# Patient Record
Sex: FEMALE | Race: WHITE | HISPANIC OR LATINO | Employment: FULL TIME | URBAN - METROPOLITAN AREA
[De-identification: names, ages, dates, MRNs, and addresses within clinical notes are randomized per-mention and may not be internally consistent; named-entity substitution may affect disease eponyms.]

---

## 2017-08-15 ENCOUNTER — TELEPHONE (OUTPATIENT)
Dept: FAMILY MEDICINE | Facility: CLINIC | Age: 32
End: 2017-08-15

## 2017-08-15 NOTE — TELEPHONE ENCOUNTER
----- Message from Silver Lee sent at 8/15/2017  9:02 AM CDT -----  Contact: pt  She's calling in regards to having labs prior to scheduled appt, please advise, [phone] (home), please call pt when orders have been entered...

## 2017-08-15 NOTE — TELEPHONE ENCOUNTER
I have never seen this patient before. I do not know her medical history, current problems, etc.  I will need to see her in the office before I can order labs.  Please notify her.

## 2017-09-08 ENCOUNTER — OFFICE VISIT (OUTPATIENT)
Dept: FAMILY MEDICINE | Facility: CLINIC | Age: 32
End: 2017-09-08
Payer: MEDICAID

## 2017-09-08 VITALS
HEIGHT: 63 IN | BODY MASS INDEX: 37.54 KG/M2 | WEIGHT: 211.88 LBS | TEMPERATURE: 99 F | SYSTOLIC BLOOD PRESSURE: 144 MMHG | DIASTOLIC BLOOD PRESSURE: 90 MMHG | OXYGEN SATURATION: 98 % | HEART RATE: 112 BPM

## 2017-09-08 DIAGNOSIS — M79.642 BILATERAL HAND PAIN: ICD-10-CM

## 2017-09-08 DIAGNOSIS — E28.2 POLYCYSTIC OVARIAN DISEASE: Primary | ICD-10-CM

## 2017-09-08 DIAGNOSIS — I10 ESSENTIAL HYPERTENSION: ICD-10-CM

## 2017-09-08 DIAGNOSIS — Z00.00 PHYSICAL EXAM, ROUTINE: ICD-10-CM

## 2017-09-08 DIAGNOSIS — E28.1 HYPERANDROGENEMIA: ICD-10-CM

## 2017-09-08 DIAGNOSIS — M79.641 BILATERAL HAND PAIN: ICD-10-CM

## 2017-09-08 PROCEDURE — 99214 OFFICE O/P EST MOD 30 MIN: CPT | Mod: PBBFAC,PO

## 2017-09-08 PROCEDURE — 99999 PR PBB SHADOW E&M-EST. PATIENT-LVL IV: CPT | Mod: PBBFAC,,,

## 2017-09-08 PROCEDURE — 99202 OFFICE O/P NEW SF 15 MIN: CPT | Mod: S$PBB,,,

## 2017-09-08 PROCEDURE — 3008F BODY MASS INDEX DOCD: CPT | Mod: ,,,

## 2017-09-08 RX ORDER — ALPRAZOLAM 1 MG/1
1 TABLET ORAL 2 TIMES DAILY PRN
Refills: 0 | COMMUNITY
Start: 2017-06-28

## 2017-09-08 RX ORDER — PROPRANOLOL HYDROCHLORIDE 10 MG/1
10 TABLET ORAL 3 TIMES DAILY
Qty: 270 TABLET | Refills: 3 | Status: SHIPPED | OUTPATIENT
Start: 2017-09-08 | End: 2018-01-23

## 2017-09-08 RX ORDER — METFORMIN HYDROCHLORIDE 1000 MG/1
1000 TABLET, FILM COATED, EXTENDED RELEASE ORAL 2 TIMES DAILY WITH MEALS
Qty: 180 TABLET | Refills: 3 | Status: SHIPPED | OUTPATIENT
Start: 2017-09-08 | End: 2018-01-23

## 2017-09-08 RX ORDER — ZALEPLON 10 MG/1
1 CAPSULE ORAL NIGHTLY
Refills: 0 | COMMUNITY
Start: 2017-07-26 | End: 2018-01-23

## 2017-09-08 RX ORDER — VENLAFAXINE HYDROCHLORIDE 75 MG/1
1 CAPSULE, EXTENDED RELEASE ORAL DAILY
Refills: 0 | COMMUNITY
Start: 2017-07-26 | End: 2018-01-23

## 2017-09-08 RX ORDER — SPIRONOLACTONE 25 MG/1
25 TABLET ORAL DAILY
Qty: 90 TABLET | Refills: 3 | Status: SHIPPED | OUTPATIENT
Start: 2017-09-08 | End: 2018-01-30 | Stop reason: SDUPTHER

## 2017-09-08 RX ORDER — DILTIAZEM HYDROCHLORIDE 120 MG/1
120 CAPSULE, EXTENDED RELEASE ORAL DAILY
COMMUNITY
End: 2018-01-23

## 2017-09-08 RX ORDER — GABAPENTIN 100 MG/1
100 CAPSULE ORAL 2 TIMES DAILY
Qty: 180 CAPSULE | Refills: 3 | Status: SHIPPED | OUTPATIENT
Start: 2017-09-08 | End: 2018-01-23

## 2017-09-08 RX ORDER — VENLAFAXINE HYDROCHLORIDE 150 MG/1
1 CAPSULE, EXTENDED RELEASE ORAL DAILY
Refills: 0 | COMMUNITY
Start: 2017-07-26 | End: 2018-01-23

## 2017-09-08 RX ORDER — DEXTROAMPHETAMINE SACCHARATE, AMPHETAMINE ASPARTATE, DEXTROAMPHETAMINE SULFATE AND AMPHETAMINE SULFATE 7.5; 7.5; 7.5; 7.5 MG/1; MG/1; MG/1; MG/1
1.5 TABLET ORAL DAILY
Refills: 0 | COMMUNITY
Start: 2017-07-26 | End: 2018-10-16

## 2017-09-08 RX ORDER — NORGESTREL AND ETHINYL ESTRADIOL 0.3-0.03MG
1 KIT ORAL DAILY
Refills: 11 | COMMUNITY
Start: 2017-08-02 | End: 2018-05-23 | Stop reason: SDUPTHER

## 2017-09-08 NOTE — PROGRESS NOTES
Subjective:       Patient ID: Rosa Maria Jones is a 32 y.o. female.    Chief Complaint: Annual Exam    HPI   The patient presents today with multiple complaints.  Worse complaint is bilateral hand pain that she describes as a constant waxing and waning burning/tingling sensation over her hands.  She says is been going on for about 2 months.  She voices some associated weakness.  She says the hand pain wakes her from her sleep at night she has been using a splint to help assuage the symptoms she has been taking some ibuprofen but says it is not helping.  The patient denies any repetitive motion type work.    She also voices a history of polycystic ovarian syndrome should which was diagnosed at age 14.  She has been on Glumetza in the past along with some Aldactone due to insulin resistance and hirsutism respectively.  She has been doing hormonal therapy for reproductive reasons but states she is no longer interested in doing that and would like to get back on some medication for her insulin resistance and high testosterone levels.  The patient is noted to be overweight.    She was noted to be overweight with a BMI 37.53 kg/m².  She denies any regular exercise routine she denies any type of diet.    Voices a chronic history of elevated heart rates she says it used to run in the 130s but now runs in the 1 teens.  She denies any syncope or near-syncope.  She denies any chest pain or shortness of breath.    Her blood pressure is noted to be elevated 144/90.  The patient says over the last 6 months it has been mildly constantly elevated.  She denies a low salt diet she denies regular exercise routine.  The patient says prior to that it was normal.  She does take amphetamines for ADD.      Review of Systems   Constitutional: Negative for activity change, appetite change, fatigue and unexpected weight change.   HENT: Negative.    Eyes: Negative.    Respiratory: Negative for cough, chest tightness, shortness of breath  and wheezing.    Cardiovascular: Negative for chest pain, palpitations and leg swelling.        Tachycardia   Gastrointestinal: Negative for constipation, diarrhea, nausea and vomiting.   Endocrine: Negative.    Genitourinary: Negative.    Musculoskeletal: Positive for arthralgias (bilateral hand pain).   Skin: Negative for color change.   Allergic/Immunologic: Negative.    Neurological: Negative for dizziness, weakness and light-headedness.   Hematological: Negative.    Psychiatric/Behavioral: Negative for sleep disturbance.         Objective:      Physical Exam   Constitutional: She is oriented to person, place, and time. She appears well-developed and well-nourished. No distress.   HENT:   Head: Normocephalic and atraumatic. Hair is normal.   Right Ear: Hearing, tympanic membrane, external ear and ear canal normal.   Left Ear: Hearing, tympanic membrane, external ear and ear canal normal.   Nose: No mucosal edema, rhinorrhea, nose lacerations, sinus tenderness, nasal deformity, septal deviation or nasal septal hematoma. No epistaxis.  No foreign bodies. Right sinus exhibits no maxillary sinus tenderness and no frontal sinus tenderness. Left sinus exhibits no maxillary sinus tenderness and no frontal sinus tenderness.   Mouth/Throat: Uvula is midline, oropharynx is clear and moist and mucous membranes are normal.   Eyes: Conjunctivae are normal. Pupils are equal, round, and reactive to light. Right eye exhibits no discharge. Left eye exhibits no discharge.   Neck: Trachea normal, normal range of motion and phonation normal. Neck supple. Normal carotid pulses, no hepatojugular reflux and no JVD present. Carotid bruit is not present. No tracheal deviation present. No thyroid mass and no thyromegaly present.   Cardiovascular: Normal rate, regular rhythm, normal heart sounds and intact distal pulses.  Exam reveals no gallop and no friction rub.    No murmur heard.  Pulmonary/Chest: Effort normal and breath sounds  normal. No respiratory distress. She has no decreased breath sounds. She has no wheezes. She has no rhonchi. She has no rales. She exhibits no tenderness.   Musculoskeletal: Normal range of motion.        Right hand: She exhibits normal range of motion, no tenderness, no bony tenderness, normal two-point discrimination, normal capillary refill, no deformity, no laceration and no swelling. Normal sensation noted. Normal strength noted.        Left hand: She exhibits normal range of motion, no tenderness, no bony tenderness, normal two-point discrimination, normal capillary refill, no deformity, no laceration and no swelling. Normal sensation noted. Normal strength noted.   Ed test at the wrist positive on the left negative on the right.   Lymphadenopathy:        Head (right side): No submental, no submandibular, no tonsillar, no preauricular, no posterior auricular and no occipital adenopathy present.        Head (left side): No submental, no submandibular, no tonsillar, no preauricular, no posterior auricular and no occipital adenopathy present.     She has no cervical adenopathy.        Right cervical: No superficial cervical, no deep cervical and no posterior cervical adenopathy present.       Left cervical: No superficial cervical, no deep cervical and no posterior cervical adenopathy present.   Neurological: She is alert and oriented to person, place, and time. She exhibits normal muscle tone. GCS eye subscore is 4. GCS verbal subscore is 5. GCS motor subscore is 6.   Skin: Skin is warm and dry. No rash noted. She is not diaphoretic. No erythema. No pallor.   Psychiatric: She has a normal mood and affect. Her speech is normal and behavior is normal. Judgment and thought content normal.       Assessment:       1. Polycystic ovarian disease    2. Hyperandrogenemia    3. Essential hypertension    4. Bilateral hand pain    5. Physical exam, routine          Plan:   Polycystic ovarian disease  -     CBC auto  differential; Future; Expected date: 09/08/2017  -     Comprehensive metabolic panel; Future; Expected date: 09/08/2017  -     Hemoglobin A1c; Future; Expected date: 09/08/2017  -     Testosterone; Future; Expected date: 09/08/2017  -     TSH; Future; Expected date: 09/08/2017  -     Lipid panel; Future; Expected date: 09/08/2017  -     spironolactone (ALDACTONE) 25 MG tablet; Take 1 tablet (25 mg total) by mouth once daily.  Dispense: 90 tablet; Refill: 3  -     metformin (GLUMETZA) 1000 MG (MOD) 24 hr tablet; Take 1 tablet (1,000 mg total) by mouth 2 (two) times daily with meals.  Dispense: 180 tablet; Refill: 3    Hyperandrogenemia  -     CBC auto differential; Future; Expected date: 09/08/2017  -     Comprehensive metabolic panel; Future; Expected date: 09/08/2017  -     Hemoglobin A1c; Future; Expected date: 09/08/2017  -     Testosterone; Future; Expected date: 09/08/2017  -     TSH; Future; Expected date: 09/08/2017  -     Lipid panel; Future; Expected date: 09/08/2017  -     propranolol (INDERAL) 10 MG tablet; Take 1 tablet (10 mg total) by mouth 3 (three) times daily.  Dispense: 270 tablet; Refill: 3    Essential hypertension  -     CBC auto differential; Future; Expected date: 09/08/2017  -     Comprehensive metabolic panel; Future; Expected date: 09/08/2017  -     Hemoglobin A1c; Future; Expected date: 09/08/2017  -     Testosterone; Future; Expected date: 09/08/2017  -     TSH; Future; Expected date: 09/08/2017  -     Lipid panel; Future; Expected date: 09/08/2017  -     gabapentin (NEURONTIN) 100 MG capsule; Take 1 capsule (100 mg total) by mouth 2 (two) times daily.  Dispense: 180 capsule; Refill: 3    Bilateral hand pain  -     CBC auto differential; Future; Expected date: 09/08/2017  -     Comprehensive metabolic panel; Future; Expected date: 09/08/2017  -     Hemoglobin A1c; Future; Expected date: 09/08/2017  -     Testosterone; Future; Expected date: 09/08/2017  -     TSH; Future; Expected date:  09/08/2017  -     Lipid panel; Future; Expected date: 09/08/2017  -     Nerve conduction test; Future    Physical exam, routine  -     CBC auto differential; Future; Expected date: 09/08/2017  -     Comprehensive metabolic panel; Future; Expected date: 09/08/2017  -     Hemoglobin A1c; Future; Expected date: 09/08/2017  -     Testosterone; Future; Expected date: 09/08/2017  -     TSH; Future; Expected date: 09/08/2017  -     Lipid panel; Future; Expected date: 09/08/2017            Disclaimer: This note is prepared using voice recognition software.  As such there may be errors in the dictation.  It has not been proofread.

## 2017-10-03 RX ORDER — ALBUTEROL SULFATE 90 UG/1
1-2 AEROSOL, METERED RESPIRATORY (INHALATION) EVERY 6 HOURS PRN
Qty: 18 G | Refills: 11 | Status: SHIPPED | OUTPATIENT
Start: 2017-10-03 | End: 2018-11-02 | Stop reason: SDUPTHER

## 2017-10-03 NOTE — TELEPHONE ENCOUNTER
----- Message from Mel Sadler sent at 10/3/2017  8:11 AM CDT -----  Call pt at [phone]//regarding a refill for Proair inhaler call to [facility] on [address]//thks ht

## 2018-01-22 ENCOUNTER — TELEPHONE (OUTPATIENT)
Dept: FAMILY MEDICINE | Facility: CLINIC | Age: 33
End: 2018-01-22

## 2018-01-22 DIAGNOSIS — J32.9 CHRONIC SINUSITIS, UNSPECIFIED LOCATION: Primary | ICD-10-CM

## 2018-01-22 NOTE — TELEPHONE ENCOUNTER
I have signed the referral but I have never seen this patient, she can come and see me maybe we can help out because I suspect that it may not be anytime soon that she will be able to see ENT.

## 2018-01-22 NOTE — TELEPHONE ENCOUNTER
Spoke with patient and informed her of advice per Dr. Ace. Verbalized understanding. Appointment scheduled.

## 2018-01-22 NOTE — TELEPHONE ENCOUNTER
----- Message from Aury Hinton sent at 1/22/2018  8:50 AM CST -----  Contact: [phone]   Pt would like to be worked in for a same day appt, pt complaints of a sore throat, and ear ache. Please call back at [phone].  x-

## 2018-01-22 NOTE — TELEPHONE ENCOUNTER
Spoke with patient and she is asking for a referral to see ENT. She has had a sinus infection for about 2 months. Is this o.k?

## 2018-01-22 NOTE — TELEPHONE ENCOUNTER
----- Message from Mary Dunn sent at 1/22/2018  8:24 AM CST -----  Contact: Patient  Patient state she needs a referral to an ENT and needs to be seen but there are no openings with any provider, please call her back at [phone]. Thank you

## 2018-01-23 ENCOUNTER — OFFICE VISIT (OUTPATIENT)
Dept: FAMILY MEDICINE | Facility: CLINIC | Age: 33
End: 2018-01-23
Payer: MEDICAID

## 2018-01-23 ENCOUNTER — LAB VISIT (OUTPATIENT)
Dept: LAB | Facility: HOSPITAL | Age: 33
End: 2018-01-23
Attending: FAMILY MEDICINE
Payer: MEDICAID

## 2018-01-23 VITALS
SYSTOLIC BLOOD PRESSURE: 120 MMHG | OXYGEN SATURATION: 98 % | BODY MASS INDEX: 37.13 KG/M2 | HEIGHT: 63 IN | WEIGHT: 209.56 LBS | DIASTOLIC BLOOD PRESSURE: 78 MMHG | HEART RATE: 109 BPM | TEMPERATURE: 99 F

## 2018-01-23 DIAGNOSIS — J02.9 SORE THROAT: ICD-10-CM

## 2018-01-23 DIAGNOSIS — J32.1 CHRONIC FRONTAL SINUSITIS: Primary | ICD-10-CM

## 2018-01-23 DIAGNOSIS — Z00.00 PHYSICAL EXAM, ROUTINE: ICD-10-CM

## 2018-01-23 DIAGNOSIS — E28.2 POLYCYSTIC OVARIAN DISEASE: ICD-10-CM

## 2018-01-23 DIAGNOSIS — M79.642 BILATERAL HAND PAIN: ICD-10-CM

## 2018-01-23 DIAGNOSIS — F41.9 ANXIETY: ICD-10-CM

## 2018-01-23 DIAGNOSIS — E28.1 HYPERANDROGENEMIA: ICD-10-CM

## 2018-01-23 DIAGNOSIS — I10 ESSENTIAL HYPERTENSION: ICD-10-CM

## 2018-01-23 DIAGNOSIS — M79.641 BILATERAL HAND PAIN: ICD-10-CM

## 2018-01-23 LAB
ALBUMIN SERPL BCP-MCNC: 4 G/DL
ALP SERPL-CCNC: 61 U/L
ALT SERPL W/O P-5'-P-CCNC: 28 U/L
ANION GAP SERPL CALC-SCNC: 11 MMOL/L
AST SERPL-CCNC: 23 U/L
BASOPHILS # BLD AUTO: 0.03 K/UL
BASOPHILS NFR BLD: 0.3 %
BILIRUB SERPL-MCNC: 0.5 MG/DL
BUN SERPL-MCNC: 10 MG/DL
CALCIUM SERPL-MCNC: 9.2 MG/DL
CHLORIDE SERPL-SCNC: 104 MMOL/L
CHOLEST SERPL-MCNC: 231 MG/DL
CHOLEST/HDLC SERPL: 7.5 {RATIO}
CO2 SERPL-SCNC: 23 MMOL/L
CREAT SERPL-MCNC: 0.8 MG/DL
DIFFERENTIAL METHOD: ABNORMAL
EOSINOPHIL # BLD AUTO: 0.3 K/UL
EOSINOPHIL NFR BLD: 3.6 %
ERYTHROCYTE [DISTWIDTH] IN BLOOD BY AUTOMATED COUNT: 12.2 %
EST. GFR  (AFRICAN AMERICAN): >60 ML/MIN/1.73 M^2
EST. GFR  (NON AFRICAN AMERICAN): >60 ML/MIN/1.73 M^2
ESTIMATED AVG GLUCOSE: 166 MG/DL
GLUCOSE SERPL-MCNC: 172 MG/DL
HBA1C MFR BLD HPLC: 7.4 %
HCT VFR BLD AUTO: 38.5 %
HDLC SERPL-MCNC: 31 MG/DL
HDLC SERPL: 13.4 %
HGB BLD-MCNC: 14 G/DL
IMM GRANULOCYTES # BLD AUTO: 0.07 K/UL
IMM GRANULOCYTES NFR BLD AUTO: 0.8 %
LDLC SERPL CALC-MCNC: 138.4 MG/DL
LYMPHOCYTES # BLD AUTO: 2.3 K/UL
LYMPHOCYTES NFR BLD: 26.3 %
MCH RBC QN AUTO: 31 PG
MCHC RBC AUTO-ENTMCNC: 36.4 G/DL
MCV RBC AUTO: 85 FL
MONOCYTES # BLD AUTO: 0.7 K/UL
MONOCYTES NFR BLD: 7.8 %
NEUTROPHILS # BLD AUTO: 5.4 K/UL
NEUTROPHILS NFR BLD: 61.2 %
NONHDLC SERPL-MCNC: 200 MG/DL
NRBC BLD-RTO: 0 /100 WBC
PLATELET # BLD AUTO: 264 K/UL
PMV BLD AUTO: 11.5 FL
POTASSIUM SERPL-SCNC: 3.7 MMOL/L
PROT SERPL-MCNC: 7.2 G/DL
RBC # BLD AUTO: 4.52 M/UL
SODIUM SERPL-SCNC: 138 MMOL/L
TESTOST SERPL-MCNC: 147 NG/DL
TRIGL SERPL-MCNC: 308 MG/DL
TSH SERPL DL<=0.005 MIU/L-ACNC: 0.58 UIU/ML
WBC # BLD AUTO: 8.82 K/UL

## 2018-01-23 PROCEDURE — 99213 OFFICE O/P EST LOW 20 MIN: CPT | Mod: PBBFAC,PO | Performed by: FAMILY MEDICINE

## 2018-01-23 PROCEDURE — 84403 ASSAY OF TOTAL TESTOSTERONE: CPT

## 2018-01-23 PROCEDURE — 85025 COMPLETE CBC W/AUTO DIFF WBC: CPT

## 2018-01-23 PROCEDURE — 84443 ASSAY THYROID STIM HORMONE: CPT

## 2018-01-23 PROCEDURE — 80053 COMPREHEN METABOLIC PANEL: CPT

## 2018-01-23 PROCEDURE — 80061 LIPID PANEL: CPT

## 2018-01-23 PROCEDURE — 99999 PR PBB SHADOW E&M-EST. PATIENT-LVL III: CPT | Mod: PBBFAC,,, | Performed by: FAMILY MEDICINE

## 2018-01-23 PROCEDURE — 36415 COLL VENOUS BLD VENIPUNCTURE: CPT | Mod: PO

## 2018-01-23 PROCEDURE — 83036 HEMOGLOBIN GLYCOSYLATED A1C: CPT

## 2018-01-23 PROCEDURE — 99214 OFFICE O/P EST MOD 30 MIN: CPT | Mod: S$PBB,,, | Performed by: FAMILY MEDICINE

## 2018-01-23 RX ORDER — LEVOFLOXACIN 500 MG/1
500 TABLET, FILM COATED ORAL DAILY
Qty: 7 TABLET | Refills: 0 | Status: SHIPPED | OUTPATIENT
Start: 2018-01-23 | End: 2018-05-23 | Stop reason: ALTCHOICE

## 2018-01-23 RX ORDER — FLUCONAZOLE 150 MG/1
150 TABLET ORAL DAILY
Qty: 3 TABLET | Refills: 0 | Status: SHIPPED | OUTPATIENT
Start: 2018-01-23 | End: 2018-05-23

## 2018-01-23 NOTE — PROGRESS NOTES
Chief Complaint:    Chief Complaint   Patient presents with    [facility]       History of Present Illness:    Patient presents today she says she's had a sinus infection for almost 2 months, sinus pain and postnasal drip yellow-green discharge.  She has finished 2 rounds of Augmentin and taken prednisone but nothing has helped.    Also has sore throat for the last 3 weeks she has done all the over-the-counter medicine that is possible.    Takes anxiety medicines Xanax as needed.    ROS:  Review of Systems   Constitutional: Negative for appetite change, chills and fever.   HENT: Positive for congestion, postnasal drip, sinus pressure and sore throat. Negative for ear discharge, ear pain, facial swelling, mouth sores, rhinorrhea, sneezing, trouble swallowing and voice change.    Eyes: Negative for discharge, redness and itching.   Respiratory: Negative for cough, chest tightness, shortness of breath and wheezing.    Cardiovascular: Negative for chest pain.   Psychiatric/Behavioral: The patient is nervous/anxious.        Past Medical History:   Diagnosis Date    ADHD (attention deficit hyperactivity disorder)     Anxiety     Asthma     Depression     Insulin resistance     PCOS (polycystic ovarian syndrome)     Tachycardia        Social History:  Social History     Social History    Marital status:      Spouse name: N/A    Number of children: N/A    Years of education: N/A     Social History Main Topics    Smoking status: Current Every Day Smoker     Packs/day: 1.00     Types: Cigarettes    Smokeless tobacco: Never Used    Alcohol use No    Drug use: No    Sexual activity: Yes     Partners: Male     Birth control/ protection: None     Other Topics Concern    None     Social History Narrative    None       Family History:   family history includes Cancer in her father; Diabetes in her father; Hypertension in her father.    Health Maintenance   Topic Date Due    Pneumococcal PPSV23  "(Medium Risk) (1) 05/16/2003    Pap Smear with HPV Cotest  05/16/2006    TETANUS VACCINE  03/19/2011    Influenza Vaccine  08/01/2017    Lipid Panel  Completed       Physical Exam:    Vital Signs  Temp: 98.5 °F (36.9 °C)  Temp src: Tympanic  Pulse: 109  SpO2: 98 %  BP: 120/78  BP Location: Left arm  Patient Position: Sitting  Pain Score: 0-No pain  Height and Weight  Height: 5' 3" (160 cm)  Weight: 95.1 kg (209 lb 8.8 oz)  BSA (Calculated - sq m): 2.06 sq meters  BMI (Calculated): 37.2  Weight in (lb) to have BMI = 25: 140.8]    Body mass index is 37.12 kg/m².    Physical Exam   Constitutional: She is oriented to person, place, and time. She appears well-developed.   HENT:   Right Ear: External ear normal.   Left Ear: External ear normal.   Nose: Right sinus exhibits maxillary sinus tenderness and frontal sinus tenderness. Left sinus exhibits maxillary sinus tenderness and frontal sinus tenderness.   Mouth/Throat: Oropharynx is clear and moist.   Eyes: Conjunctivae are normal. Pupils are equal, round, and reactive to light.   Neck: Normal range of motion. Neck supple.   Cardiovascular: Normal rate, regular rhythm and normal heart sounds.    No murmur heard.  Pulmonary/Chest: Effort normal and breath sounds normal. No respiratory distress. She has no wheezes. She has no rales. She exhibits no tenderness.   Abdominal: Soft. She exhibits no distension and no mass. There is no tenderness. There is no guarding.   Musculoskeletal: She exhibits no edema or tenderness.   Lymphadenopathy:     She has no cervical adenopathy.   Neurological: She is alert and oriented to person, place, and time. She has normal reflexes.   Skin: Skin is warm and dry.   Psychiatric: She has a normal mood and affect. Her behavior is normal. Judgment and thought content normal.       No results found for: CHOL, TRIG, HDL, TOTALCHOLEST, NONHDLCHOL    No results found for: HGBA1C    Assessment:      ICD-10-CM ICD-9-CM   1. Chronic frontal " sinusitis J32.1 473.1   2. Sore throat J02.9 462   3. Anxiety F41.9 300.00         Plan:  1.  Treat with Levaquin and Diflucan, if no improvement consider doing sinus CT.  2.  Chronic sore throat, recommend nasopharyngoscopy  evaluation by ENT.  3.  Continue Xanax when necessary.  4.  Smoking cessation strongly advised patient says she is working on it.    Orders Placed This Encounter   Procedures    Ambulatory referral to ENT       Current Outpatient Prescriptions   Medication Sig Dispense Refill    albuterol (PROAIR HFA) 90 mcg/actuation inhaler Inhale 1-2 puffs into the lungs every 6 (six) hours as needed. As needed 18 g 11    alprazolam (XANAX) 1 MG tablet Take 1 tablet by mouth once daily.  0    dextroamphetamine-amphetamine 30 mg Tab Take 1.5 tablets by mouth once daily.  0    LOW-OGESTREL, 28, 0.3-30 mg-mcg per tablet Take 1 tablet by mouth once daily.  11    spironolactone (ALDACTONE) 25 MG tablet Take 1 tablet (25 mg total) by mouth once daily. 90 tablet 3    fluconazole (DIFLUCAN) 150 MG Tab Take 1 tablet (150 mg total) by mouth once daily. 3 tablet 0    levoFLOXacin (LEVAQUIN) 500 MG tablet Take 1 tablet (500 mg total) by mouth once daily. 7 tablet 0     No current facility-administered medications for this visit.        Medications Discontinued During This Encounter   Medication Reason    diltiaZEM HCl (TIAZAC) 120 mg 24 hr capsule Patient no longer taking    gabapentin (NEURONTIN) 100 MG capsule Patient no longer taking    metformin (GLUMETZA) 1000 MG (MOD) 24 hr tablet Patient no longer taking    propranolol (INDERAL) 10 MG tablet Patient no longer taking    venlafaxine (EFFEXOR-XR) 150 MG Cp24 Patient no longer taking    venlafaxine (EFFEXOR-XR) 75 MG 24 hr capsule Patient no longer taking    zaleplon (SONATA) 10 MG capsule Patient no longer taking       No Follow-up on file.      Venkata Ace MD

## 2018-01-26 DIAGNOSIS — E11.9 TYPE 2 DIABETES MELLITUS WITHOUT COMPLICATION: ICD-10-CM

## 2018-01-30 ENCOUNTER — OFFICE VISIT (OUTPATIENT)
Dept: FAMILY MEDICINE | Facility: CLINIC | Age: 33
End: 2018-01-30
Payer: MEDICAID

## 2018-01-30 ENCOUNTER — LAB VISIT (OUTPATIENT)
Dept: LAB | Facility: HOSPITAL | Age: 33
End: 2018-01-30
Attending: FAMILY MEDICINE
Payer: MEDICAID

## 2018-01-30 VITALS
WEIGHT: 209.88 LBS | TEMPERATURE: 98 F | SYSTOLIC BLOOD PRESSURE: 132 MMHG | HEIGHT: 63 IN | OXYGEN SATURATION: 99 % | DIASTOLIC BLOOD PRESSURE: 82 MMHG | RESPIRATION RATE: 12 BRPM | BODY MASS INDEX: 37.19 KG/M2 | HEART RATE: 99 BPM

## 2018-01-30 DIAGNOSIS — E11.9 TYPE 2 DIABETES MELLITUS WITHOUT COMPLICATION, WITHOUT LONG-TERM CURRENT USE OF INSULIN: ICD-10-CM

## 2018-01-30 DIAGNOSIS — E11.9 TYPE 2 DIABETES MELLITUS WITHOUT COMPLICATION, WITHOUT LONG-TERM CURRENT USE OF INSULIN: Primary | ICD-10-CM

## 2018-01-30 DIAGNOSIS — E28.2 POLYCYSTIC OVARIAN DISEASE: ICD-10-CM

## 2018-01-30 DIAGNOSIS — E78.5 HYPERLIPIDEMIA, UNSPECIFIED HYPERLIPIDEMIA TYPE: ICD-10-CM

## 2018-01-30 PROCEDURE — 36415 COLL VENOUS BLD VENIPUNCTURE: CPT | Mod: PO

## 2018-01-30 PROCEDURE — 99215 OFFICE O/P EST HI 40 MIN: CPT | Mod: PBBFAC,PO

## 2018-01-30 PROCEDURE — 3008F BODY MASS INDEX DOCD: CPT | Mod: ,,,

## 2018-01-30 PROCEDURE — 99999 PR PBB SHADOW E&M-EST. PATIENT-LVL V: CPT | Mod: PBBFAC,,,

## 2018-01-30 PROCEDURE — 99214 OFFICE O/P EST MOD 30 MIN: CPT | Mod: S$PBB,,,

## 2018-01-30 PROCEDURE — 82306 VITAMIN D 25 HYDROXY: CPT

## 2018-01-30 RX ORDER — ATORVASTATIN CALCIUM 20 MG/1
20 TABLET, FILM COATED ORAL DAILY
Qty: 90 TABLET | Refills: 3 | Status: SHIPPED | OUTPATIENT
Start: 2018-01-30 | End: 2020-05-06

## 2018-01-30 RX ORDER — GLIMEPIRIDE 2 MG/1
2 TABLET ORAL
Qty: 90 TABLET | Refills: 3 | Status: SHIPPED | OUTPATIENT
Start: 2018-01-30 | End: 2020-05-06

## 2018-01-30 RX ORDER — SPIRONOLACTONE 50 MG/1
50 TABLET, FILM COATED ORAL DAILY
Qty: 90 TABLET | Refills: 3 | Status: SHIPPED | OUTPATIENT
Start: 2018-01-30 | End: 2018-09-24 | Stop reason: SDUPTHER

## 2018-01-30 RX ORDER — METFORMIN HYDROCHLORIDE 500 MG/1
500 TABLET ORAL 2 TIMES DAILY WITH MEALS
Qty: 180 TABLET | Refills: 3 | Status: SHIPPED | OUTPATIENT
Start: 2018-01-30 | End: 2020-05-06

## 2018-01-31 ENCOUNTER — TELEPHONE (OUTPATIENT)
Dept: FAMILY MEDICINE | Facility: CLINIC | Age: 33
End: 2018-01-31

## 2018-01-31 ENCOUNTER — PATIENT MESSAGE (OUTPATIENT)
Dept: FAMILY MEDICINE | Facility: CLINIC | Age: 33
End: 2018-01-31

## 2018-01-31 DIAGNOSIS — E55.9 VITAMIN D INSUFFICIENCY: Primary | ICD-10-CM

## 2018-01-31 LAB — 25(OH)D3+25(OH)D2 SERPL-MCNC: 18 NG/ML

## 2018-01-31 RX ORDER — ERGOCALCIFEROL 1.25 MG/1
50000 CAPSULE ORAL
Qty: 12 CAPSULE | Refills: 1 | Status: SHIPPED | OUTPATIENT
Start: 2018-01-31 | End: 2018-07-11 | Stop reason: SDUPTHER

## 2018-01-31 NOTE — TELEPHONE ENCOUNTER
----- Message from Aspen ULISSES Goncalves sent at 1/31/2018 12:01 PM CST -----  Contact: PT Portal Request  Appointment Request From: Rosa Maria Jones    With Provider: Other - (see comments)    Would Accept With:Request to see a new provider    Preferred Date Range: Any date 1/25/2018 or later    Preferred Times: Any    Reason for visit: Request an Appt    Comments:  I thought I had a consult scheduled for an ENT but I don't see it in my appointment list. I am not able to schedule it through the [facility] either. Please schedule the first available at any location on any date. I will make it work. Thanks so much! Have a great day!

## 2018-02-08 ENCOUNTER — PATIENT MESSAGE (OUTPATIENT)
Dept: OBSTETRICS AND GYNECOLOGY | Facility: CLINIC | Age: 33
End: 2018-02-08

## 2018-03-22 ENCOUNTER — PATIENT OUTREACH (OUTPATIENT)
Dept: ADMINISTRATIVE | Facility: HOSPITAL | Age: 33
End: 2018-03-22

## 2018-03-22 NOTE — PROGRESS NOTES
Pre visit chart audit performed. I spoke with pt that does have a pap smear scheduled with Kenna Carrillo on 4/4/18 @ 11:15am. Pt has also agreed to have her DM Eye Exam and would like to have that exam [facility]. I did call dept for appt and was unable to reach them. I have left a message for them to call me back for pt appt. I did let the pt know I would call her back for the appt and would mail her an appt slip in the mail. Pt did want to see ENT sooner but I was unable to get pt a sooner appt. Pt will see Dr Izaguirre on the 28th and if she gets worse will go to ER to be seen. Pt verbalized understanding of needed appt's.

## 2018-03-23 NOTE — PROGRESS NOTES
I contacted pt and let her know her DM Eye Exam was booked for 6/14/18 @ 9:30am with Dr Ed Pineda @ our [facility] location. Pt verbalized understanding for needed appt.

## 2018-04-17 NOTE — PROGRESS NOTES
Subjective:       Patient ID: Rosa Maria Jones is a 32 y.o. female.    Chief Complaint: No chief complaint on file.    HPI   Patient presents today to review lab work that was performed last week after an initial visit.  The patient has voiced a history of polycystic ovarian syndrome with associated insulin resistance.  An A1c was drawn which did not being 7.4%, which is indicative of diabetes.  The patient is overweight with a BMI of 37.18 kg/m².  The patient denies a regular exercise routine.  She does not have any formal regulated diet.  Patient states it has been 1 year almost since she has seen an optometrist/ophthalmologist.    The patient also had a lipid panel drawn at the same time.  Her cholesterol was 231 and HDL was 31.  The patient denies a low fat diet she denies regular exercise routine.  She denies any chest pain shortness of breath.  According to [place] data the patient's 10 year risk of developing cardiovascular disease is 12.4%.    Patient voices a history of polycystic ovarian syndrome with associated hirsutism.  She is taking 25 mg spironolactone daily to help assuage her hirsutism.  She says is not helping at the current dose and asked about increasing the dose.       Review of Systems   Constitutional: Negative for activity change, appetite change, fatigue and unexpected weight change.   Eyes: Negative.  Negative for discharge, redness and visual disturbance.   Respiratory: Negative for cough, chest tightness, shortness of breath and wheezing.    Cardiovascular: Negative for chest pain, palpitations and leg swelling.   Gastrointestinal: Negative for constipation, diarrhea, nausea and vomiting.   Endocrine: Negative for polydipsia and polyuria.   Genitourinary: Negative for dysuria and vaginal discharge.   Musculoskeletal: Negative.    Skin: Negative for color change and rash.   Allergic/Immunologic: Negative.    Neurological: Negative for dizziness, weakness and light-headedness.    Hematological: Negative for adenopathy. Does not bruise/bleed easily.   Psychiatric/Behavioral: Negative for dysphoric mood and sleep disturbance.         Objective:      Physical Exam   Constitutional: She is oriented to person, place, and time. She appears well-developed and well-nourished.   HENT:   Head: Normocephalic and atraumatic.   Eyes: Conjunctivae are normal. Pupils are equal, round, and reactive to light. No scleral icterus.   Neck: Normal range of motion. Neck supple.   Cardiovascular: Normal rate, regular rhythm, normal heart sounds and intact distal pulses.  Exam reveals no gallop and no friction rub.    No murmur heard.  Pulses:       Dorsalis pedis pulses are 2+ on the right side, and 2+ on the left side.        Posterior tibial pulses are 2+ on the right side, and 2+ on the left side.   Pulmonary/Chest: Effort normal and breath sounds normal. No respiratory distress. She has no wheezes. She has no rales. She exhibits no tenderness.   Musculoskeletal: Normal range of motion.        Right foot: There is normal range of motion and no deformity.        Left foot: There is normal range of motion and no deformity.   Feet:   Right Foot:   Protective Sensation: 10 sites tested. 10 sites sensed.   Skin Integrity: Negative for ulcer, blister, skin breakdown, erythema, warmth, callus or dry skin.   Left Foot:   Protective Sensation: 10 sites tested. 10 sites sensed.   Skin Integrity: Negative for ulcer, blister, skin breakdown, erythema, warmth, callus or dry skin.   Lymphadenopathy:     She has no cervical adenopathy.   Neurological: She is alert and oriented to person, place, and time. She exhibits normal muscle tone. Coordination normal.   Skin: Skin is warm and dry. No rash noted. No erythema. No pallor.   Psychiatric: She has a normal mood and affect. Her behavior is normal. Judgment and thought content normal.   Vitals reviewed.      Assessment:       1. Type 2 diabetes mellitus without complication,  without long-term current use of insulin    2. Hyperlipidemia, unspecified hyperlipidemia type    3. Polycystic ovarian disease          Plan:   Type 2 diabetes mellitus without complication, without long-term current use of insulin  -     metFORMIN (GLUCOPHAGE) 500 MG tablet; Take 1 tablet (500 mg total) by mouth 2 (two) times daily with meals.  Dispense: 180 tablet; Refill: 3  -     glimepiride (AMARYL) 2 MG tablet; Take 1 tablet (2 mg total) by mouth before breakfast.  Dispense: 90 tablet; Refill: 3  -     Vitamin D; Future; Expected date: 01/30/2018  -     Hepatic function panel; Future; Expected date: 02/28/2018  -     Lipid panel; Future; Expected date: 04/30/2018  -     Hemoglobin A1c; Future; Expected date: 04/30/2018  -     Ambulatory referral to Optometry    Hyperlipidemia, unspecified hyperlipidemia type  -     atorvastatin (LIPITOR) 20 MG tablet; Take 1 tablet (20 mg total) by mouth once daily.  Dispense: 90 tablet; Refill: 3  -     Vitamin D; Future; Expected date: 01/30/2018  -     Hepatic function panel; Future; Expected date: 02/28/2018  -     Lipid panel; Future; Expected date: 04/30/2018  -     Hemoglobin A1c; Future; Expected date: 04/30/2018  -     Ambulatory referral to Optometry    Polycystic ovarian disease  -     spironolactone (ALDACTONE) 50 MG tablet; Take 1 tablet (50 mg total) by mouth once daily.  Dispense: 90 tablet; Refill: 3            Disclaimer: This note is prepared using voice recognition software.     Here for PST prior to surgical procedure Here for PST prior to surgical procedure. S/P  shunt valve replacement.

## 2018-04-30 ENCOUNTER — LAB VISIT (OUTPATIENT)
Dept: LAB | Facility: HOSPITAL | Age: 33
End: 2018-04-30
Attending: FAMILY MEDICINE
Payer: MEDICAID

## 2018-04-30 DIAGNOSIS — E11.9 TYPE 2 DIABETES MELLITUS WITHOUT COMPLICATION, WITHOUT LONG-TERM CURRENT USE OF INSULIN: ICD-10-CM

## 2018-04-30 DIAGNOSIS — E78.5 HYPERLIPIDEMIA, UNSPECIFIED HYPERLIPIDEMIA TYPE: ICD-10-CM

## 2018-04-30 LAB
CHOLEST SERPL-MCNC: 248 MG/DL
CHOLEST/HDLC SERPL: 8.3 {RATIO}
ESTIMATED AVG GLUCOSE: 128 MG/DL
HBA1C MFR BLD HPLC: 6.1 %
HDLC SERPL-MCNC: 30 MG/DL
HDLC SERPL: 12.1 %
LDLC SERPL CALC-MCNC: 170.2 MG/DL
NONHDLC SERPL-MCNC: 218 MG/DL
TRIGL SERPL-MCNC: 239 MG/DL

## 2018-04-30 PROCEDURE — 36415 COLL VENOUS BLD VENIPUNCTURE: CPT | Mod: PO

## 2018-04-30 PROCEDURE — 80061 LIPID PANEL: CPT

## 2018-04-30 PROCEDURE — 83036 HEMOGLOBIN GLYCOSYLATED A1C: CPT

## 2018-05-23 ENCOUNTER — PATIENT MESSAGE (OUTPATIENT)
Dept: OBSTETRICS AND GYNECOLOGY | Facility: CLINIC | Age: 33
End: 2018-05-23

## 2018-05-23 ENCOUNTER — PATIENT MESSAGE (OUTPATIENT)
Dept: FAMILY MEDICINE | Facility: CLINIC | Age: 33
End: 2018-05-23

## 2018-05-23 ENCOUNTER — OFFICE VISIT (OUTPATIENT)
Dept: OTOLARYNGOLOGY | Facility: CLINIC | Age: 33
End: 2018-05-23
Payer: MEDICAID

## 2018-05-23 ENCOUNTER — OFFICE VISIT (OUTPATIENT)
Dept: OBSTETRICS AND GYNECOLOGY | Facility: CLINIC | Age: 33
End: 2018-05-23
Payer: MEDICAID

## 2018-05-23 ENCOUNTER — LAB VISIT (OUTPATIENT)
Dept: LAB | Facility: HOSPITAL | Age: 33
End: 2018-05-23
Attending: OTOLARYNGOLOGY
Payer: MEDICAID

## 2018-05-23 ENCOUNTER — TELEPHONE (OUTPATIENT)
Dept: OTOLARYNGOLOGY | Facility: CLINIC | Age: 33
End: 2018-05-23

## 2018-05-23 VITALS
TEMPERATURE: 99 F | SYSTOLIC BLOOD PRESSURE: 131 MMHG | BODY MASS INDEX: 36.01 KG/M2 | HEIGHT: 63 IN | DIASTOLIC BLOOD PRESSURE: 88 MMHG | WEIGHT: 203.25 LBS | HEART RATE: 118 BPM

## 2018-05-23 VITALS
BODY MASS INDEX: 35.7 KG/M2 | WEIGHT: 201.5 LBS | SYSTOLIC BLOOD PRESSURE: 122 MMHG | HEIGHT: 63 IN | DIASTOLIC BLOOD PRESSURE: 88 MMHG

## 2018-05-23 DIAGNOSIS — J35.8 TONSILLOLITH: ICD-10-CM

## 2018-05-23 DIAGNOSIS — Z86.39 HISTORY OF THYROID NODULE: ICD-10-CM

## 2018-05-23 DIAGNOSIS — N60.02 BREAST CYST, LEFT: ICD-10-CM

## 2018-05-23 DIAGNOSIS — J35.1 TONSILLAR HYPERTROPHY: ICD-10-CM

## 2018-05-23 DIAGNOSIS — E28.2 POLYCYSTIC OVARIAN DISEASE: ICD-10-CM

## 2018-05-23 DIAGNOSIS — Z86.39 HISTORY OF THYROID NODULE: Primary | ICD-10-CM

## 2018-05-23 DIAGNOSIS — Z30.41 ENCOUNTER FOR SURVEILLANCE OF CONTRACEPTIVE PILLS: ICD-10-CM

## 2018-05-23 DIAGNOSIS — F17.200 SMOKING: Primary | ICD-10-CM

## 2018-05-23 DIAGNOSIS — Z01.419 ENCOUNTER FOR GYNECOLOGICAL EXAMINATION WITHOUT ABNORMAL FINDING: Primary | ICD-10-CM

## 2018-05-23 LAB — TSH SERPL DL<=0.005 MIU/L-ACNC: 0.93 UIU/ML

## 2018-05-23 PROCEDURE — 99204 OFFICE O/P NEW MOD 45 MIN: CPT | Mod: S$PBB,,, | Performed by: OTOLARYNGOLOGY

## 2018-05-23 PROCEDURE — 87624 HPV HI-RISK TYP POOLED RSLT: CPT

## 2018-05-23 PROCEDURE — 36415 COLL VENOUS BLD VENIPUNCTURE: CPT

## 2018-05-23 PROCEDURE — 88141 CYTOPATH C/V INTERPRET: CPT | Mod: ,,, | Performed by: PATHOLOGY

## 2018-05-23 PROCEDURE — 84443 ASSAY THYROID STIM HORMONE: CPT

## 2018-05-23 PROCEDURE — 99214 OFFICE O/P EST MOD 30 MIN: CPT | Mod: PBBFAC,PO | Performed by: NURSE PRACTITIONER

## 2018-05-23 PROCEDURE — 99999 PR PBB SHADOW E&M-EST. PATIENT-LVL III: CPT | Mod: PBBFAC,,, | Performed by: OTOLARYNGOLOGY

## 2018-05-23 PROCEDURE — 99213 OFFICE O/P EST LOW 20 MIN: CPT | Mod: PBBFAC,27 | Performed by: OTOLARYNGOLOGY

## 2018-05-23 PROCEDURE — 99395 PREV VISIT EST AGE 18-39: CPT | Mod: 25,S$PBB,, | Performed by: NURSE PRACTITIONER

## 2018-05-23 PROCEDURE — 99999 PR PBB SHADOW E&M-EST. PATIENT-LVL IV: CPT | Mod: PBBFAC,,, | Performed by: NURSE PRACTITIONER

## 2018-05-23 PROCEDURE — 88175 CYTOPATH C/V AUTO FLUID REDO: CPT | Performed by: PATHOLOGY

## 2018-05-23 RX ORDER — NORGESTREL AND ETHINYL ESTRADIOL 0.3-0.03MG
1 KIT ORAL DAILY
Qty: 28 TABLET | Refills: 11 | Status: SHIPPED | OUTPATIENT
Start: 2018-05-23 | End: 2019-10-30 | Stop reason: SDUPTHER

## 2018-05-23 RX ORDER — DILTIAZEM HYDROCHLORIDE 120 MG/1
CAPSULE, EXTENDED RELEASE ORAL
Refills: 11 | COMMUNITY
Start: 2018-05-20 | End: 2020-05-06

## 2018-05-23 RX ORDER — GABAPENTIN 100 MG/1
CAPSULE ORAL
Refills: 3 | COMMUNITY
Start: 2018-05-20 | End: 2018-06-14

## 2018-05-23 NOTE — LETTER
May 23, 2018      Venkata Ace MD  [address] [address] [address]           [facility]  [address] [place] [id number]  Phone: [phone]  Fax: [phone]          Patient: Rosa Maria Jones   MR Number: [id number]   YOB: 1985   Date of Visit: 5/23/2018       Dear Dr. Venkata Ace:    Thank you for referring Rosa Maria Jones to me for evaluation. Attached you will find relevant portions of my assessment and plan of care.    If you have questions, please do not hesitate to call me. I look forward to following Rosa Maria Jones along with you.    Sincerely,    Bao Bolden MD    Enclosure  CC:  No Recipients    If you would like to receive this communication electronically, please contact [email or web address] or [phone] to request more information on [facility] access.    For providers and/or their staff who would like to refer a patient to [facility], please contact us through our one-stop-shop provider referral line, [facility], at [phone].    If you feel you have received this communication in error or would no longer like to receive these types of communications, please e-mail [email or web address]

## 2018-05-23 NOTE — PROGRESS NOTES
Referring Provider:    Venkata Ace Md  [address] [place] [address]  Subjective:   Patient: Rosa Maria Jones [id number], :1985   Visit date:2018 2:10 PM    Chief Complaint:  Sore Throat and Cerumen Impaction    HPI:  Rosa Maria is a 33 y.o. female who I was asked to see in consultation for evaluation of chronic throat discomfort:      Patient reports a sore throat earlier this year beginning in January and lasting for 2-3 months. She was seen byher PCP and tx for sinusitis w/ Levaquin and the sore throat since has resolved. She denies pain in her throat today. Though, she does c/o tonsil stones and swelling in her tonsils on and off, denies difficulty breathing. She also reports a hx of a nodule in her neck she believes this was on her thyroid, pt states last US revealed 2-3 mm nodule. She does not have these records with her today. She denies any recent fevers or infections. She removes tonsillar stones at home w/ Q-tips. No other relieving factors.     Severity: moderate  Quality: dull / achy  Voice Quality: clear  Duration: 3 month(s)  Modifying factors:  None  Associated signs and symptoms:  Pain with swallowing  Post nasal drip or significant rhinorrhea:  No  Bad taste in the back of the mouth: No  Heartburn: No  Prior medical therapy:    None      Review of Systems:  Negative unless checked off.  Gen:  []fever   []fatigue  HENT:  []nosebleeds  []dental problem   Eyes:  []photophobia  []visual disturbance  Resp:  []chest tightness []wheezing  Card:  []chest pain  []leg swelling  GI:  []abdominal pain []blood in stool  :  []dysuria  []hematuria  Musc:  []joint swelling  []gait problem  Skin:  []color change  []pallor  Neuro:  []seizures  []numbness  Hem:  []bruise/bleed easily  Psych:  []hallucinations  []behavioral problems  Allergy/Imm: is allergic to dairy aid [lactase] and latex, natural rubber.    Her meds, allergies, medical, surgical, social & family histories were  "reviewed & updated:  -     She has a current medication list which includes the following prescription(s): albuterol, alprazolam, atorvastatin, dextroamphetamine-amphetamine, diltiazem hcl, ergocalciferol, gabapentin, glimepiride, low-ogestrel (28), metformin, and spironolactone.  -     She  has a past medical history of Abnormal Pap smear of cervix (2015); ADHD (attention deficit hyperactivity disorder); Anxiety; Asthma; Depression; Diabetes type 2, controlled; Insulin resistance; PCOS (polycystic ovarian syndrome); and Tachycardia.   -     She  does not have any pertinent problems on file.   -     She  has a past surgical history that includes cold knife cone (2015).  -     She  reports that she has been smoking Cigarettes.  She has been smoking about 1.00 pack per day. She has never used smokeless tobacco. She reports that she does not drink alcohol or use drugs.  -     Her family history includes Cancer in her father; Diabetes in her father; Hypertension in her father.  -     She is allergic to dairy aid [lactase] and latex, natural rubber.    Objective:   Physical Exam:  Vitals:  /88 (BP Location: Right arm, Patient Position: Sitting, BP Method: Large (Automatic))   Pulse (!) 118   Temp 98.7 °F (37.1 °C) (Tympanic)   Ht 5' 3" (1.6 m)   Wt 92.2 kg (203 lb 4.2 oz)   BMI 36.01 kg/m²   General appearance:  Well developed, well nourished    Eyes:  Extraocular motions intact, PERRL    Communication:  no hoarseness, no dysphonia    Ears:  Otoscopy of external auditory canals and tympanic membranes was normal, clinical speech reception thresholds grossly intact, no mass/lesion of auricle.  Nose:  No masses/lesions of external nose, nasal mucosa, septum, and turbinates were within normal limits.  Mouth:  No mass/lesion of lips, teeth, gums, hard/soft palate, tongue, tonsils, or oropharynx. Tonsils 2+, no stones present    Cardiovascular:  No pedal edema; Radial Pulses +2     Neck & Lymphatics:  No cervical " lymphadenopathy, no neck mass/crepitus/ asymmetry, trachea is midline, no thyroid enlargement/tenderness/mass.    Psych: Oriented x3,  Alert with normal mood and affect.     Respiration/Chest:  Symmetric expansion during respiration, normal respiratory effort.    Skin:  Warm and intact. No ulcerations of face, scalp, neck.      Assessment & Plan:     Tonsil Hypertrophy, bilateral  -Will monitor at this time, no surgical intervention.     Tonsillolith  .plan    Hx of thyroid nodule  -TSH  -US   I will call the patient with results and further treatment recommendations.     Over 25 minutes were spent in face to face contact with the patient with greater than 50% spent discussing their diagnosis and coordination of care.     Bao Bolden MD.

## 2018-05-23 NOTE — PATIENT INSTRUCTIONS
Breast Anatomy    Breasts come in all shapes and sizes. But they all share the same features. You can learn about the parts, or anatomy, of your breasts. This will help you know what you're seeing and feeling. Then you can learn what's normal for your breasts.     The areola is a dark [place] of skin that surrounds the nipple.  The nipple is the outlet for milk during breastfeeding.  Fibrous tissue supports your breasts, making them feel firm.  Lobules (mammary glands) produce milk during pregnancy and breastfeeding.  Ducts carry milk from the lobules during breastfeeding.  Fatty tissue fills the spaces around the ducts and lobules.  Axillary lymph nodes filter lymph fluid from your breast and help your body fight infection.  Ribs can be felt beneath the skin.  The clavicle (collarbone) marks the upper boundary of the breast tissue.  The sternum (breastbone) can be felt beneath the skin.  Chest muscles help move your arm.     Date Last Reviewed: 8/13/2015  © 5177-2805 [employer]. [address] [place] [address]. All rights reserved. This information is not intended as a substitute for professional medical care. Always follow your healthcare professional's instructions.        What Are Benign Breast Conditions?  Most breast conditions are benign (noncancerous), causing no serious harm to you. But all women are at some risk for breast cancer. Your risk increases as you grow older. So if you notice any breast changes that arent normal for you, see your healthcare provider. This helps to make sure that you receive proper treatment if there is a problem.  Breast infection  Infections of the breast tissue can cause skin redness, warmth, and pain or tenderness. The most common infection is mastitis. This inflammation of the mammary glands can happen during breastfeeding. Mastitis and other breast infections are often treated with antibiotics.  Nipple discharge  Many women can squeeze a tiny  amount of a clear or milky discharge from 1 or both nipples. This discharge may be normal. Other kinds of discharge may be symptoms of a breast condition. A dark discharge can be caused by an intraductal papilloma (a benign growth in a duct near the nipple). A nipple discharge that is dark or bloody, or that happens without squeezing your nipple, should be checked by your healthcare provider.    Fibrocystic changes  These benign changes may cause a thickening in the breasts. Breasts may be tender or painful. They may feel more dense in some areas than in others. Sometimes solid or fluid-filled lumps (cysts) may also form. Cysts may be smooth, soft or firm, and tender. They may become larger and more tender right before your period.    Benign breast lumps  Benign breast lumps come in all shapes, textures, and sizes. A fibroadenoma (a lump of fibrous tissue) may be smooth, firm, and rubbery. This type of lump is usually painless and movable. Have any lump checked by your healthcare provider.  Date Last Reviewed: 8/13/2015 © 2000-2017 [employer]. [address] [place] [address]. All rights reserved. This information is not intended as a substitute for professional medical care. Always follow your healthcare professional's instructions.        Understanding Breast Cysts    A breast cyst is a fluid-filled sac that forms in the breast tissue. You can have one or more cysts at a time. They can occur in one or both breasts. Breast cysts can be soft or firm and vary in size. In most cases, they are not cancer (benign). Having breast cysts does not increase your risk for breast cancer.  What causes a breast cyst?  Experts dont know the exact cause of breast cysts. They think that cysts form because of hormone changes that happen during a womans menstrual cycle. They are most common in women ages 35 to 50.  Symptoms of a breast cyst  Often, a breast cyst has no symptoms. The cyst may have been found  during a breast exam or imaging test. If symptoms do occur, they can include:  · A round or oval lump in the breast that can be seen or felt  · Pain, discomfort, or soreness around the lump  · A lump that grows larger or that feels more painful and tender just before your period  Treatment for a breast cyst  Most breast cysts do not need treatment. They may even go away on their own. Breast cysts that are very painful or large may need to be treated. Options include:  · Drainage (aspiration) of the cyst. The healthcare provider uses a needle and syringe to remove the fluid from the cyst.  · Surgical removal of the cyst. The provider cuts the cyst out. It may need to be drained first.  Possible complications of a breast cyst  A breast cyst may go away but then form again. This can happen even after treatment.  When to call your healthcare provider  Call your healthcare provider right away if you have any of these:  · Fever of 100.4°F (38°C) or higher, or as directed  · Pain or soreness in the breast that doesnt go away or that gets worse  · An abnormal lump in the breast  · A change in skin color over the breast  · Dimpling or puckering of the skin over the breast  · A nipple that becomes pulled in (retracted)  · Unusual, dark, or bloody discharge from a nipple   Date Last Reviewed: 6/1/2016  © 7210-4929 [employer]. [address] [place] [address]. All rights reserved. This information is not intended as a substitute for professional medical care. Always follow your healthcare professional's instructions.        The Range of Pap Test Results  When your Pap test is sent to the lab, the lab studies your cell samples and reports any abnormal cell changes. Your health care provider can discuss these changes with you. In some cases, an abnormal Pap test is due to an infection. More serious cell changes range from dysplasia to cancer. Talk to your health care provider about your Pap  test.    Normal results  Cervical cells, even normal ones, are always changing. As they mature, normal squamous cells move from deeper layers within the cervix. Over time, these cells flatten and cover the surface of the cervix. Within the cervical canal, the cells are different. These glandular cells are taller and not as flat as the cells on the surface of the cervix. When a Pap test sample shows healthy cells of both types, the results are negative. Keep having Pap tests as often as directed.  Abnormal results  A positive Pap test result means some cells in the sample showed abnormal changes. These results are grouped by the type of cell change and the location, or extent, of the changes. Depending on the results, you may need further testing.  · Inflammation: Noncancerous changes are present. They may be due to normal cell repair. Or, they may be caused by an infection, such as HPV or yeast. Further testing may be needed. (Also called reactive cellular changes.)  · Atypical squamous cells: Test results are unclear. Cells on the surface of the cervix show changes, but their significance is not yet known. Testing for HPV and other sexually transmitted infections (STIs) may be needed. Treatment may be required. (Reported as ASC-US or ASC-H.)  · Atypical glandular cells: Cells lining the cervical canal show abnormal changes. Further testing is likely. You may also have treatment to destroy or remove problem cells. (Reported as AGC.)  · Mild dysplasia: Cells show distinct changes. More testing or HPV typing may be done. You may also have treatment to destroy or remove problem cells. (Reported as low-grade DEVONTE or JN 1.)  · Moderate to severe dysplasia: Cells show precancerous changes. Or, noninvasive cancer (carcinoma in situ) may be present. Treatment to destroy or remove problem cells is likely. (Reported as high-grade DEVONTE or JN 2 or JN 3.)  · Cancer: Different types of cancer may be detected by your Pap test.  More tests to assess the cancer's extent are likely. The type of treatment will depend on the test results and other factors, such as age and health history. (Reported as squamous cell carcinoma, endocervical adenocarcinoma in situ, or adenocarcinoma.)  Date Last Reviewed: 5/12/2015  © 1599-5519 [employer]. [address] [place] [address]. All rights reserved. This information is not intended as a substitute for professional medical care. Always follow your healthcare professional's instructions.        Polycystic Ovary Syndrome  Polycystic ovary syndrome (PCOS) causes harmless, small cysts in the ovaries and other symptoms. PCOS is caused by certain hormones being out of balance. The word syndrome means a group of symptoms. Women with PCOS may have no periods, irregular periods, or very long periods.    Your ovaries  Women store their eggs in their ovaries. Each egg is in a capsule called a follicle. Normally during the reproductive years, one follicle grows to produce a mature egg each month. This egg is released during ovulation and the follicle dissolves.  Hormones out of balance  With polycystic ovary syndrome (PCOS), the hormones that control ovulation are out of balance. These include estrogen, progesterones, and androgen. As a result, ovulation may not occur. Instead, the follicle stays enlarged. This is a fluid-filled sac (cyst). Over time, the ovaries fill with many small cysts. This is why they are called poly (many) cystic ovaries. In some women, the ovaries also make too much androgen.  PCOS symptoms  Women with PCOS may also have one or more of these symptoms:  · Trouble getting pregnant (fertility problems)  · Weight gain, especially around the waist   · Acne  · Hair growth on the face and other parts of the body  · Patches of thickened, velvety, darkened skin called acanthosis nigricans  Women with PCOS are also at increased risk of developing cancer of the uterine  lining, diabetes, and heart disease.   Date Last Reviewed: 5/10/2015  © 7901-7838 [employer]. [address] [place] [address], [place] [address]. All rights reserved. This information is not intended as a substitute for professional medical care. Always follow your healthcare professional's instructions.

## 2018-05-23 NOTE — PROGRESS NOTES
CC: Well woman exam    Rosa Maria Jones is a 33 y.o. female  presents for well woman exam.  LMP: No LMP recorded (within months). Patient is not currently having periods (Reason: Other)..    History of pcos on ocp, still not with regular cycle. Prior to ocp use - had no cycle for 8 years.   History of abnormal pap and colpo at [facility] with no f/u.    Has pain to left breast and feels a nodule for at least a year.    Pt states has been on spironolactone for many years and was on a 200 mg dose, MD she was seeing no longer practicing in [place], and she has not been increased to this dose.    Past Medical History:   Diagnosis Date    Abnormal Pap smear of cervix     at [facility] JN 3 with what sounds pt had [facility] all paps since normal    ADHD (attention deficit hyperactivity disorder)     Anxiety     Asthma     Depression     Diabetes type 2, controlled     Insulin resistance     PCOS (polycystic ovarian syndrome)     Tachycardia      Past Surgical History:   Procedure Laterality Date    cold knife cone      Pt states laser surgery at [facility] to cervix and vaginal wall - pap f/u was normal      Social History     Social History    Marital status:      Spouse name: N/A    Number of children: N/A    Years of education: N/A     Occupational History    Not on file.     Social History Main Topics    Smoking status: Current Every Day Smoker     Packs/day: 1.00     Types: Cigarettes    Smokeless tobacco: Never Used    Alcohol use No    Drug use: No    Sexual activity: Yes     Partners: Male     Birth control/ protection: OCP     Other Topics Concern    Not on file     Social History Narrative    No narrative on file     Family History   Problem Relation Age of Onset    Diabetes Father     Cancer Father         Salivary gland    Hypertension Father      OB History      Para Term  AB Living    1       1      SAB TAB Ectopic Multiple Live Births    1                  BP  "122/88   Ht 5' 3" (1.6 m)   Wt 91.4 kg (201 lb 8 oz)   LMP  (Within Months)   BMI 35.69 kg/m²       ROS:  GENERAL: Denies weight gain or weight loss. Feeling well overall.   SKIN: Denies rash or lesions.   HEAD: Denies head injury or headache.   NODES: Denies enlarged lymph nodes.   CHEST: Denies chest pain or shortness of breath.   CARDIOVASCULAR: Denies palpitations or left sided chest pain.   ABDOMEN: No abdominal pain, constipation, diarrhea, nausea, vomiting or rectal bleeding.   URINARY: No frequency, dysuria, hematuria, or burning on urination.  REPRODUCTIVE: See HPI.   BREASTS: see hpi  HEMATOLOGIC: No easy bruisability or excessive bleeding.   MUSCULOSKELETAL: Denies joint pain or swelling.   NEUROLOGIC: Denies syncope or weakness.   PSYCHIATRIC: Denies depression, anxiety or mood swings.    PHYSICAL EXAM:  APPEARANCE: Well nourished, well developed, in no acute distress.  AFFECT: WNL, alert and oriented x 3  SKIN: No acne or hirsutism  NECK: Neck symmetric without masses or thyromegaly  NODES: No inguinal, cervical, axillary, or femoral lymph node enlargement  CHEST: Good respiratory effect  ABDOMEN: Soft.  No tenderness or masses.  No hepatosplenomegaly.  No hernias.  BREASTS: Symmetrical, no skin changes or visible lesions.  Small cystic nodule to left mid breast, tender, no nipple discharge bilaterally.  PELVIC: Normal external genitalia without lesions.  Normal hair distribution.  Adequate perineal body, normal urethral meatus.  Vagina moist and well rugated without lesions or discharge.  Cervix pink mild scarring noted at 6 o'clock position, without lesions, discharge or tenderness.  No significant cystocele or rectocele.  Bimanual exam shows uterus to be normal size, regular, mobile and nontender.  Adnexa without masses or tenderness.    EXTREMITIES: No edema.  Physical Exam    1. Encounter for gynecological examination without abnormal finding  Liquid-based pap smear, screening   2. Encounter " for surveillance of contraceptive pills  LOW-OGESTREL, 28, 0.3-30 mg-mcg per tablet   3. Polycystic ovarian disease  LOW-OGESTREL, 28, 0.3-30 mg-mcg per tablet   4. Breast cyst, left  US Breast Left Complete    Mammo Digital Diagnostic Bilat with CAD    AND PLAN:    Patient was counseled today on A.C.S. Pap guidelines and recommendations for yearly pelvic exams, mammograms and monthly self breast exams; to see her PCP for other health maintenance.       Refill ocp  Instructed pt do not feel comfortable with increasing her aldactone and will have to see MD or Endocrine MD for this  Set up dx mmg and u/s pending wants to see general surgry  [facility]

## 2018-05-23 NOTE — TELEPHONE ENCOUNTER
Patient would like information on smoking cessation program with [facility]. Please refer to smoking cessation program if acceptable.       Attempted to contact patient in order to inform her that her request about the smoking cessation program was being processed and that she would receive a phone call with more information in the near future. No answer.

## 2018-05-23 NOTE — TELEPHONE ENCOUNTER
Spoke to patient, informing her that her request about the smoking cessation program was being processed and that she should receive a phone call with more information in the near future. Patient verbalized understanding. Call ended well.

## 2018-05-28 ENCOUNTER — TELEPHONE (OUTPATIENT)
Dept: OTOLARYNGOLOGY | Facility: CLINIC | Age: 33
End: 2018-05-28

## 2018-05-28 NOTE — TELEPHONE ENCOUNTER
Spoke with pt and informed of normal results. Pt verbalized understanding and instructed to call for any further questions

## 2018-05-28 NOTE — TELEPHONE ENCOUNTER
----- Message from Bao Bolden MD sent at 5/25/2018  5:17 PM CDT -----  Please call patient with normal results.

## 2018-05-30 ENCOUNTER — TELEPHONE (OUTPATIENT)
Dept: RADIOLOGY | Facility: HOSPITAL | Age: 33
End: 2018-05-30

## 2018-05-31 ENCOUNTER — HOSPITAL ENCOUNTER (OUTPATIENT)
Dept: RADIOLOGY | Facility: HOSPITAL | Age: 33
Discharge: HOME | End: 2018-05-31
Attending: NURSE PRACTITIONER
Payer: MEDICAID

## 2018-05-31 VITALS — BODY MASS INDEX: 35.97 KG/M2 | WEIGHT: 203 LBS | HEIGHT: 63 IN

## 2018-05-31 DIAGNOSIS — N60.02 BREAST CYST, LEFT: ICD-10-CM

## 2018-05-31 PROCEDURE — 77062 BREAST TOMOSYNTHESIS BI: CPT | Mod: 26,,, | Performed by: RADIOLOGY

## 2018-05-31 PROCEDURE — 77066 DX MAMMO INCL CAD BI: CPT | Mod: TC,PO

## 2018-05-31 PROCEDURE — 76642 ULTRASOUND BREAST LIMITED: CPT | Mod: 26,LT,, | Performed by: RADIOLOGY

## 2018-05-31 PROCEDURE — 77066 DX MAMMO INCL CAD BI: CPT | Mod: 26,,, | Performed by: RADIOLOGY

## 2018-05-31 PROCEDURE — 76642 ULTRASOUND BREAST LIMITED: CPT | Mod: TC,PO,LT

## 2018-06-01 ENCOUNTER — PATIENT MESSAGE (OUTPATIENT)
Dept: OBSTETRICS AND GYNECOLOGY | Facility: CLINIC | Age: 33
End: 2018-06-01

## 2018-06-01 ENCOUNTER — TELEPHONE (OUTPATIENT)
Dept: OBSTETRICS AND GYNECOLOGY | Facility: CLINIC | Age: 33
End: 2018-06-01

## 2018-06-01 LAB
HPV16 AG SPEC QL: NEGATIVE
HPV16+18+H RISK 12 DNA CVX-IMP: POSITIVE
HPV18 DNA SPEC QL NAA+PROBE: NEGATIVE

## 2018-06-01 NOTE — TELEPHONE ENCOUNTER
Attempted to call pt. Left voicemail for pt to give clinic a call back. Regarding ASCUS pap and mammo results.

## 2018-06-01 NOTE — TELEPHONE ENCOUNTER
----- Message from Kenna Mcknight NP sent at 6/1/2018  9:03 AM CDT -----  Mammogram and breast us were normal but due to her history she is over the 20% dmitri for possibly developing breast cancer in her lifetime at 21.44%  Needs appt with Mariela Aldridge

## 2018-06-01 NOTE — TELEPHONE ENCOUNTER
----- Message from Kenna Mcknight NP sent at 6/1/2018 12:40 PM CDT -----  Pap was ASCUS with positive HPV - pt will need a colposcopy set up

## 2018-06-01 NOTE — TELEPHONE ENCOUNTER
Spoke with pt and notified of ASCUS pap and mammo results and recommendations. Pt verbalized understanding. Scheduled appt for colpo per pt request and a message has been sent to [facility]'s staff.

## 2018-06-08 ENCOUNTER — HOSPITAL ENCOUNTER (OUTPATIENT)
Dept: RADIOLOGY | Facility: HOSPITAL | Age: 33
Discharge: HOME | End: 2018-06-08
Attending: OTOLARYNGOLOGY
Payer: MEDICAID

## 2018-06-08 ENCOUNTER — PATIENT MESSAGE (OUTPATIENT)
Dept: OTOLARYNGOLOGY | Facility: CLINIC | Age: 33
End: 2018-06-08

## 2018-06-08 DIAGNOSIS — Z86.39 HISTORY OF THYROID NODULE: ICD-10-CM

## 2018-06-08 PROCEDURE — 76536 US EXAM OF HEAD AND NECK: CPT | Mod: TC

## 2018-06-08 PROCEDURE — 76536 US EXAM OF HEAD AND NECK: CPT | Mod: 26,,, | Performed by: RADIOLOGY

## 2018-06-11 ENCOUNTER — PATIENT MESSAGE (OUTPATIENT)
Dept: OTOLARYNGOLOGY | Facility: CLINIC | Age: 33
End: 2018-06-11

## 2018-06-11 ENCOUNTER — OFFICE VISIT (OUTPATIENT)
Dept: SURGERY | Facility: CLINIC | Age: 33
End: 2018-06-11
Payer: MEDICAID

## 2018-06-11 ENCOUNTER — PATIENT MESSAGE (OUTPATIENT)
Dept: OBSTETRICS AND GYNECOLOGY | Facility: CLINIC | Age: 33
End: 2018-06-11

## 2018-06-11 VITALS
SYSTOLIC BLOOD PRESSURE: 144 MMHG | BODY MASS INDEX: 35.97 KG/M2 | WEIGHT: 203 LBS | HEIGHT: 63 IN | DIASTOLIC BLOOD PRESSURE: 85 MMHG | HEART RATE: 112 BPM

## 2018-06-11 DIAGNOSIS — E04.1 THYROID NODULE: Primary | ICD-10-CM

## 2018-06-11 DIAGNOSIS — Z71.89 COUNSELING REGARDING GOALS OF CARE: ICD-10-CM

## 2018-06-11 DIAGNOSIS — Z91.89 AT HIGH RISK FOR BREAST CANCER: Primary | ICD-10-CM

## 2018-06-11 DIAGNOSIS — Z55.9 EDUCATIONAL CIRCUMSTANCE: ICD-10-CM

## 2018-06-11 PROCEDURE — 99999 PR PBB SHADOW E&M-EST. PATIENT-LVL III: CPT | Mod: PBBFAC,,, | Performed by: NURSE PRACTITIONER

## 2018-06-11 PROCEDURE — 99215 OFFICE O/P EST HI 40 MIN: CPT | Mod: S$PBB,,, | Performed by: NURSE PRACTITIONER

## 2018-06-11 PROCEDURE — 99213 OFFICE O/P EST LOW 20 MIN: CPT | Mod: PBBFAC | Performed by: NURSE PRACTITIONER

## 2018-06-11 RX ORDER — OXCARBAZEPINE 300 MG/1
150 TABLET, FILM COATED ORAL 2 TIMES DAILY
COMMUNITY
End: 2018-10-16

## 2018-06-11 SDOH — SOCIAL DETERMINANTS OF HEALTH (SDOH): PROBLEMS RELATED TO EDUCATION AND LITERACY, UNSPECIFIED: Z55.9

## 2018-06-11 NOTE — PROGRESS NOTES
Patient ID: Rosa Maria Jones is a 33 y.o. female.    Chief Complaint: High risk for breast cancer (21.44%)      HPI:  Patient presents for evaluation of an elevated breast Cancer risk assessment score of 21.44%    Risk factors identified:     Menarche at 13 y/o  G 1 P misc    LMP: 4-5 months ago  Estrogen: BCP- fertility treatment - one cycle  Radiation to the neck or chest wall- none    Prior breast biopsies or atypical hyperplasia- none     FH: none     Wt- 199 lb  Ht- 63 inches  BMI: 35.96    Review of Systems   Constitutional: Negative.    HENT: Negative.    Eyes: Negative.    Respiratory: Negative.    Cardiovascular: Negative.    Gastrointestinal: Negative.         No reflux   Endocrine: Negative.    Genitourinary: Negative.    Musculoskeletal: Negative.    Skin: Negative.    Allergic/Immunologic: Negative.    Neurological: Negative.    Hematological: Negative.  Negative for adenopathy.   Psychiatric/Behavioral: Negative.    Breast: Pt denies relates having left breast pain with palpable thickening for a couple of years- has not noted any change in character. Discomfort mainly with hugging. Denies any nipple discharge. Has noted a thickening left breast at site of discomfort. No prior trauma or bruising. No breast surgeries or abnormalities.    Current Outpatient Prescriptions   Medication Sig Dispense Refill    albuterol (PROAIR HFA) 90 mcg/actuation inhaler Inhale 1-2 puffs into the lungs every 6 (six) hours as needed. As needed 18 g 11    alprazolam (XANAX) 1 MG tablet Take 1 tablet by mouth once daily.  0    atorvastatin (LIPITOR) 20 MG tablet Take 1 tablet (20 mg total) by mouth once daily. 90 tablet 3    dextroamphetamine-amphetamine 30 mg Tab Take 1.5 tablets by mouth once daily.  0    diltiaZEM HCl (TIAZAC) 120 mg 24 hr capsule TK 1 C PO QD  11    ergocalciferol (ERGOCALCIFEROL) 50,000 unit Cap Take 1 capsule (50,000 Units total) by mouth every 7 days. 12 capsule 1    gabapentin (NEURONTIN)  100 MG capsule TK ONE C PO  BID  3    glimepiride (AMARYL) 2 MG tablet Take 1 tablet (2 mg total) by mouth before breakfast. 90 tablet 3    LOW-OGESTREL, 28, 0.3-30 mg-mcg per tablet Take 1 tablet by mouth once daily. 28 tablet 11    metFORMIN (GLUCOPHAGE) 500 MG tablet Take 1 tablet (500 mg total) by mouth 2 (two) times daily with meals. 180 tablet 3    OXcarbazepine (TRILEPTAL) 300 MG Tab Take 150 mg by mouth 2 (two) times daily.      spironolactone (ALDACTONE) 50 MG tablet Take 1 tablet (50 mg total) by mouth once daily. 90 tablet 3     No current facility-administered medications for this visit.        Review of patient's allergies indicates:   Allergen Reactions    Dairy aid [lactase]     Latex, natural rubber        Past Medical History:   Diagnosis Date    Abnormal Pap smear of cervix 2015    at [facility] JN 3 with what sounds pt had [facility] all paps since normal    ADHD (attention deficit hyperactivity disorder)     Anxiety     Asthma     Depression     Diabetes type 2, controlled     Insulin resistance     PCOS (polycystic ovarian syndrome)     Tachycardia        Past Surgical History:   Procedure Laterality Date    cold knife cone  2015    [facility] - pap f/u was normal        Family History   Problem Relation Age of Onset    Diabetes Father     Cancer Father         Salivary gland    Hypertension Father     Breast cancer Maternal Grandmother        Social History     Social History    Marital status:      Spouse name: N/A    Number of children: N/A    Years of education: N/A     Occupational History    Not on file.     Social History Main Topics    Smoking status: Current Every Day Smoker     Packs/day: 1.00     Types: Cigarettes    Smokeless tobacco: Never Used    Alcohol use No    Drug use: No    Sexual activity: Yes     Partners: Male     Birth control/ protection: OCP     Other Topics Concern    Not on file     Social  History Narrative    No narrative on file       Vitals:    06/11/18 1359   BP: (!) 144/85   Pulse: (!) 112       Physical Exam   Constitutional: She is oriented to person, place, and time. She appears well-developed and well-nourished.   HENT:   Head: Normocephalic and atraumatic.   Right Ear: External ear normal.   Left Ear: External ear normal.   Mouth/Throat: No oropharyngeal exudate.   Eyes: Conjunctivae and EOM are normal. Pupils are equal, round, and reactive to light. Right eye exhibits no discharge. Left eye exhibits no discharge. No scleral icterus.   Neck: Normal range of motion. Neck supple. No thyromegaly present.   Cardiovascular: Normal rate, regular rhythm and normal heart sounds.    Pulmonary/Chest: Effort normal and breath sounds normal. Right breast exhibits no inverted nipple, no mass, no nipple discharge, no skin change and no tenderness. Left breast exhibits no inverted nipple, no mass, no nipple discharge, no skin change and no tenderness.       Abdominal: Soft. Bowel sounds are normal.   Musculoskeletal: Normal range of motion. She exhibits no edema.        Right shoulder: She exhibits no crepitus and normal strength.   Lymphadenopathy:        Head (right side): No submental, no submandibular, no tonsillar, no preauricular, no posterior auricular and no occipital adenopathy present.        Head (left side): No submental, no submandibular, no tonsillar, no preauricular, no posterior auricular and no occipital adenopathy present.     She has no cervical adenopathy.        Right cervical: No superficial cervical and no posterior cervical adenopathy present.       Left cervical: No superficial cervical and no posterior cervical adenopathy present.     She has no axillary adenopathy.        Right: No supraclavicular adenopathy present.        Left: No supraclavicular adenopathy present.   Neurological: She is alert and oriented to person, place, and time. She has normal reflexes.   Skin: Skin is  warm and dry. No rash noted. No erythema. No pallor.   Psychiatric: She has a normal mood and affect. Her behavior is normal. Judgment and thought content normal.       [id number]  Result:  Mammo Digital Diagnostic Bilat with Tomosynthesis_CAD  US Breast Left Limited     History:  Patient is 33 y.o. and is seen for a diagnostic mammogram.     Films Compared:  Baseline exam.     Findings:  Computer-aided detection was utilized in the interpretation of this examination. This procedure was performed using tomosynthesis.       The breasts are almost entirely fatty. Limited breast ultrasound was performed in area of reported palpable abnormality on left.  Skin marker was placed at site. . There is no evidence of suspicious masses, microcalcifications or architectural distortion.      Ultrasound evaluation demonstrates no suspicious abnormality.      Impression:  There is no mammographic or sonographic evidence of malignancy.     Results of mammogram and/or ultrasound should not preclude biopsy of any clinically palpable and/or suspicious nodule.     BI-RADS Category 1: Negative     Recommendation:  Annual mammogram is recommended beginning at age 40.     The patient's estimated lifetime risk of breast cancer (to age 85) based on Tyrer-Cuzick - 7 risk assessment model is: Tyrer-Cuzick: 21.44 %. According to the American Cancer Society,  patients with a lifetime breast cancer risk of 20% or higher might benefit from supplemental screening tests.    Encounter     View Encounter                Assessment & Plan:  At high risk for breast cancer  -     MRI Breast Bilateral W WO Contrast; Future; Expected date: 2018    Educational circumstance    Counseling regarding goals of care    Explained results of risk assessment and recommendations for additional screening with MRI in 6 months - Nov/Dec-alternating with Diagnostic mammograms    Discussed modifiable risk factors such as diet and exercise. Reviewed diet and  counseled pt regarding eating more fruits and veges throughout the day. Pt has already lost about 34 # over the past year.   Does  Not formally exercise but does yard work for long periods of time.  Explained benefits of loosing a few pounds to decrease estrogen exposure from fat cells. Pt has set a goal of 10 # wt loss by her next visit.     Would not recommend genetic testing due to  No family history of breast cancer.    Discussed use of tamoxifen for the reduction of breast cancer risk- Smoker and Pt declines at this time due to the potential for hot flashes and blood clots.     BSE technique was demonstrated and explained. Related what to look and feel for on exam monthly. Pt verbalized understanding and return demonstrated proper technique and knowledge of what to look for on self exam.   One hour- 60 minutes was spent with this patient and 75% was spent identifying risk factors, reviewing records and educating pt regarding risk reduction strategies and planning future screenings.    Information about strategies to decrease breast cancer risk factors from Up to Date given to the pt.

## 2018-06-13 NOTE — TELEPHONE ENCOUNTER
Spoke with patient to schedule in office FNA at [facility] location for 6/22/18 with verbal understanding from patient. Directions for location verbally explained.

## 2018-06-14 ENCOUNTER — OFFICE VISIT (OUTPATIENT)
Dept: OPHTHALMOLOGY | Facility: CLINIC | Age: 33
End: 2018-06-14
Payer: MEDICAID

## 2018-06-14 DIAGNOSIS — H40.003 GLAUCOMA SUSPECT, BILATERAL: ICD-10-CM

## 2018-06-14 DIAGNOSIS — H52.03 HYPEROPIA, BILATERAL: ICD-10-CM

## 2018-06-14 DIAGNOSIS — E11.9 DIABETES MELLITUS TYPE 2 WITHOUT RETINOPATHY: Primary | ICD-10-CM

## 2018-06-14 DIAGNOSIS — H52.4 BILATERAL PRESBYOPIA: ICD-10-CM

## 2018-06-14 PROCEDURE — 99212 OFFICE O/P EST SF 10 MIN: CPT | Mod: PBBFAC | Performed by: OPTOMETRIST

## 2018-06-14 PROCEDURE — 92015 DETERMINE REFRACTIVE STATE: CPT | Mod: ,,, | Performed by: OPTOMETRIST

## 2018-06-14 PROCEDURE — 99999 PR PBB SHADOW E&M-EST. PATIENT-LVL II: CPT | Mod: PBBFAC,,, | Performed by: OPTOMETRIST

## 2018-06-14 PROCEDURE — 92004 COMPRE OPH EXAM NEW PT 1/>: CPT | Mod: S$PBB,,, | Performed by: OPTOMETRIST

## 2018-06-14 NOTE — PROGRESS NOTES
HPI     Patient is here to establish care , patient was diagnosed with diabetes   in 01/18 and is here for a baseline , last AIC 6.1 IN 03/18.  Lab Results       Component                Value               Date                       HGBA1C                   6.1 (H)             04/30/2018            NP  +DM 01/18    Last edited by Ed Pineda, OD on 6/14/2018 10:46 AM. (History)            Assessment /Plan     For exam results, see Encounter Report.    Diabetes mellitus type 2 without retinopathy    Hyperopia, bilateral    Bilateral presbyopia    Glaucoma suspect, bilateral      No Background Diabetic Retinopathy    Elevated IOP, no family history of glaucoma, healthy ONH OU    History of accommodative esotropia.    Dispense Final Rx for glasses.  RTC 1 year  Discussed above and answered questions.

## 2018-06-14 NOTE — PATIENT INSTRUCTIONS
Diabetes mellitus type 2 without retinopathy     Hyperopia, bilateral     Bilateral presbyopia     Glaucoma suspect, bilateral        No Background Diabetic Retinopathy     Elevated IOP, no family history of glaucoma, healthy ONH OU     History of accommodative esotropia.     Dispense Final Rx for glasses.  RTC 1 year  Discussed above and answered questions.

## 2018-06-15 ENCOUNTER — PROCEDURE VISIT (OUTPATIENT)
Dept: OBSTETRICS AND GYNECOLOGY | Facility: CLINIC | Age: 33
End: 2018-06-15
Payer: MEDICAID

## 2018-06-15 VITALS
SYSTOLIC BLOOD PRESSURE: 125 MMHG | DIASTOLIC BLOOD PRESSURE: 81 MMHG | WEIGHT: 206.13 LBS | HEIGHT: 63 IN | BODY MASS INDEX: 36.52 KG/M2

## 2018-06-15 DIAGNOSIS — R87.610 ASCUS WITH POSITIVE HIGH RISK HPV CERVICAL: Primary | ICD-10-CM

## 2018-06-15 DIAGNOSIS — R87.810 ASCUS WITH POSITIVE HIGH RISK HPV CERVICAL: Primary | ICD-10-CM

## 2018-06-15 PROCEDURE — 88342 IMHCHEM/IMCYTCHM 1ST ANTB: CPT | Mod: 26,,, | Performed by: PATHOLOGY

## 2018-06-15 PROCEDURE — 81025 URINE PREGNANCY TEST: CPT | Mod: PBBFAC,PO | Performed by: OBSTETRICS & GYNECOLOGY

## 2018-06-15 PROCEDURE — 88305 TISSUE EXAM BY PATHOLOGIST: CPT | Performed by: PATHOLOGY

## 2018-06-15 PROCEDURE — 88342 IMHCHEM/IMCYTCHM 1ST ANTB: CPT | Performed by: PATHOLOGY

## 2018-06-15 PROCEDURE — 88305 TISSUE EXAM BY PATHOLOGIST: CPT | Mod: 26,,, | Performed by: PATHOLOGY

## 2018-06-15 NOTE — PROCEDURES
Colposcopy-Today  Date/Time: 6/15/2018 10:29 AM  Performed by: YASIR MORALES  Authorized by: YASIR MORALES     Consent Done?:  Yes (Written)  Assistants?: No      Colposcopy Site:  Cervix  Position:  Supine  Acrowhite Lesion? Yes    Atypical Vessels: No    Transformation Zone Adequate?: Yes    Biopsy?: Yes         Location:  Cervix ((12 00))  ECC Performed?: No    LEEP Performed?: No    Estimated blood loss (cc):  1   Patient tolerated the procedure well with no immediate complications.   Post-operative instructions were provided for the patient.   Patient was discharged and will follow up if any complications occur     COLPOSCOPY:    Rosa Maria Jones is a 33 y.o. female   presents for colposcopy.  No LMP recorded. Patient is not currently having periods (Reason: Other)..  Her most recent pap smear shows ASCUS with POSITIVE high risk HPV.  Pt with history of JN 2-3 and is s/p LEEP in  with Dr. Mills.    The abnormal test findings were discussed, as well as HPV infection, need for colposcopy and possible biopsies to determine the plan of care, treatments available, the minimal risk of bleeding and infection with colposcopy, and alternatives to colposcopy.  Pt voiced understanding and desires to proceed.      UPT is negative    COLPOSCOPY EXAM:   TIME OUT PERFORMED.     No gross vulvovaginal or cervix lesions noted.  On colpo: no mosaicism, no punctation, no abnormal vasculature and acetowhite lesion(s) noted at 12 o'clock    Biopsy was taken at 12 o'clock (biopsy difficult to obtain due to scarring on cervix and scant tissue noted in specimen).  ECC was not performed.   SCJ was entirely visualized.    Hemostasis was adequate with application of Monsel's solution.  The speculum was removed.  The patient did tolerate the procedure well.    All collected specimens sent to pathology for histologic analysis.    Post-colposcopy counseling:  The patient was instructed to manage post-colposcopy cramping  with NSAIDs or Tylenol, or with a prescription per the medication card.  Avoid intercourse, douching, or tampons in the vagina for at least 2-3 days.  Expect a clumpy blackish discharge due to Monsel's solution application for several days.  Report heavy bleeding, worsening pain or pain that does not respond to above medications, or foul-smelling vaginal discharge. HPV vaccine recommended according to FDA age guidelines.  Importance of follow-up stressed.      Follow up based on colposcopy results.  Impression:  JN 1.  If confirmed by pathology results, recommend follow-up pap in 12 months.

## 2018-06-20 ENCOUNTER — PATIENT MESSAGE (OUTPATIENT)
Dept: OTOLARYNGOLOGY | Facility: CLINIC | Age: 33
End: 2018-06-20

## 2018-06-20 NOTE — TELEPHONE ENCOUNTER
Appointment has bee rescheduled to 6/25/18 at the [facility] location at 11:45 am.

## 2018-06-25 ENCOUNTER — OFFICE VISIT (OUTPATIENT)
Dept: OTOLARYNGOLOGY | Facility: CLINIC | Age: 33
End: 2018-06-25
Attending: OTOLARYNGOLOGY
Payer: MEDICAID

## 2018-06-25 ENCOUNTER — PATIENT MESSAGE (OUTPATIENT)
Dept: OBSTETRICS AND GYNECOLOGY | Facility: CLINIC | Age: 33
End: 2018-06-25

## 2018-06-25 VITALS
WEIGHT: 204.56 LBS | SYSTOLIC BLOOD PRESSURE: 142 MMHG | BODY MASS INDEX: 36.24 KG/M2 | TEMPERATURE: 98 F | HEART RATE: 104 BPM | DIASTOLIC BLOOD PRESSURE: 98 MMHG

## 2018-06-25 DIAGNOSIS — E04.1 THYROID NODULE: ICD-10-CM

## 2018-06-25 PROCEDURE — 99499 UNLISTED E&M SERVICE: CPT | Mod: S$PBB,,, | Performed by: OTOLARYNGOLOGY

## 2018-06-25 PROCEDURE — 99999 PR PBB SHADOW E&M-EST. PATIENT-LVL III: CPT | Mod: PBBFAC,,, | Performed by: OTOLARYNGOLOGY

## 2018-06-25 PROCEDURE — 99213 OFFICE O/P EST LOW 20 MIN: CPT | Mod: PBBFAC,PO | Performed by: OTOLARYNGOLOGY

## 2018-06-25 RX ORDER — OXCARBAZEPINE 300 MG/1
TABLET, FILM COATED ORAL
COMMUNITY
Start: 2018-06-06 | End: 2018-06-25 | Stop reason: SDUPTHER

## 2018-06-25 RX ORDER — DEXTROAMPHETAMINE SACCHARATE, AMPHETAMINE ASPARTATE, DEXTROAMPHETAMINE SULFATE AND AMPHETAMINE SULFATE 3.75; 3.75; 3.75; 3.75 MG/1; MG/1; MG/1; MG/1
15 TABLET ORAL 4 TIMES DAILY
COMMUNITY
Start: 2015-04-01 | End: 2020-09-22

## 2018-06-28 NOTE — PROGRESS NOTES
FNA was attempted but due to depth of nodule and neck circumference, it could not be accessed with biopsy equipment available in clinic.  Given that the nodule is borderline for biopsy, I recommended repeat US in 6 months, which she agreed to.

## 2018-07-11 DIAGNOSIS — E55.9 VITAMIN D INSUFFICIENCY: ICD-10-CM

## 2018-07-11 RX ORDER — ERGOCALCIFEROL 1.25 MG/1
50000 CAPSULE ORAL
Qty: 12 CAPSULE | Refills: 1 | Status: SHIPPED | OUTPATIENT
Start: 2018-07-11 | End: 2019-01-07

## 2018-09-24 DIAGNOSIS — E28.2 POLYCYSTIC OVARIAN DISEASE: ICD-10-CM

## 2018-09-24 RX ORDER — SPIRONOLACTONE 50 MG/1
50 TABLET, FILM COATED ORAL DAILY
Qty: 90 TABLET | Refills: 3 | Status: SHIPPED | OUTPATIENT
Start: 2018-09-24 | End: 2020-05-06

## 2018-09-24 RX ORDER — GABAPENTIN 100 MG/1
CAPSULE ORAL
Refills: 3 | COMMUNITY
Start: 2018-06-18 | End: 2018-09-24 | Stop reason: SDUPTHER

## 2018-09-24 RX ORDER — PROPRANOLOL HYDROCHLORIDE 10 MG/1
TABLET ORAL
Qty: 90 TABLET | Refills: 3 | Status: SHIPPED | OUTPATIENT
Start: 2018-09-24 | End: 2020-05-06

## 2018-09-24 RX ORDER — GABAPENTIN 100 MG/1
CAPSULE ORAL
Qty: 60 CAPSULE | Refills: 3 | Status: SHIPPED | OUTPATIENT
Start: 2018-09-24 | End: 2018-10-16

## 2018-09-24 RX ORDER — PROPRANOLOL HYDROCHLORIDE 10 MG/1
TABLET ORAL
Refills: 3 | COMMUNITY
Start: 2018-06-18 | End: 2018-09-24 | Stop reason: SDUPTHER

## 2018-10-16 ENCOUNTER — OFFICE VISIT (OUTPATIENT)
Dept: OBSTETRICS AND GYNECOLOGY | Facility: CLINIC | Age: 33
End: 2018-10-16
Payer: MEDICAID

## 2018-10-16 VITALS
SYSTOLIC BLOOD PRESSURE: 126 MMHG | WEIGHT: 195.75 LBS | DIASTOLIC BLOOD PRESSURE: 78 MMHG | HEIGHT: 63 IN | BODY MASS INDEX: 34.68 KG/M2

## 2018-10-16 DIAGNOSIS — Z30.8 ENCOUNTER FOR OTHER CONTRACEPTIVE MANAGEMENT: Primary | ICD-10-CM

## 2018-10-16 PROCEDURE — 99213 OFFICE O/P EST LOW 20 MIN: CPT | Mod: PBBFAC,PO | Performed by: OBSTETRICS & GYNECOLOGY

## 2018-10-16 PROCEDURE — 99212 OFFICE O/P EST SF 10 MIN: CPT | Mod: S$PBB,,, | Performed by: OBSTETRICS & GYNECOLOGY

## 2018-10-16 PROCEDURE — 99999 PR PBB SHADOW E&M-EST. PATIENT-LVL III: CPT | Mod: PBBFAC,,, | Performed by: OBSTETRICS & GYNECOLOGY

## 2018-10-16 RX ORDER — ARIPIPRAZOLE 5 MG/1
TABLET ORAL
COMMUNITY
Start: 2018-09-24 | End: 2020-05-06

## 2018-10-16 RX ORDER — LISDEXAMFETAMINE DIMESYLATE 40 MG/1
CAPSULE ORAL
Refills: 0 | COMMUNITY
Start: 2018-08-07 | End: 2020-05-06

## 2018-10-16 RX ORDER — FLUTICASONE PROPIONATE 50 MCG
SPRAY, SUSPENSION (ML) NASAL
COMMUNITY
Start: 2018-09-17 | End: 2019-01-04

## 2018-10-16 RX ORDER — CETIRIZINE HYDROCHLORIDE 10 MG/1
TABLET ORAL
Refills: 0 | COMMUNITY
Start: 2018-08-25 | End: 2019-01-04 | Stop reason: ALTCHOICE

## 2018-10-16 RX ORDER — TOPIRAMATE 100 MG/1
TABLET, FILM COATED ORAL
COMMUNITY
Start: 2018-09-19 | End: 2020-05-06

## 2018-10-16 RX ORDER — TRAZODONE HYDROCHLORIDE 100 MG/1
TABLET ORAL
Refills: 0 | COMMUNITY
Start: 2018-07-29 | End: 2020-05-06

## 2018-10-16 RX ORDER — METHYLPHENIDATE HYDROCHLORIDE 18 MG/1
TABLET ORAL
Refills: 0 | COMMUNITY
Start: 2018-08-04 | End: 2020-05-06

## 2018-10-16 NOTE — PROGRESS NOTES
Subjective:       Patient ID: Rosa Maria Jones is a 33 y.o. female.    Chief Complaint:  Contraception      History of Present Illness  HPI  Contraception Counseling  Patient presents for contraception counseling. The patient has no complaints today. The patient is sexually active with . Pt had been doing well on current OCP but was recently started on Topamax (to suppress increased appetite associated with Abilify use).  Pt was advised that this new medication alters efficacy of contraceptive hormones and is here to discuss options.  Pt has no desire for future fertility.  Pt is here with her  who was present for entire visit.  Menses have been regular on OCP but have become heavier since starting Topamax.      GYN & OB History  Patient's last menstrual period was 10/15/2018.   Date of Last Pap: 2018    OB History    Para Term  AB Living   1       1     SAB TAB Ectopic Multiple Live Births   1              # Outcome Date GA Lbr Dc/2nd Weight Sex Delivery Anes PTL Lv   1 SAB                   Review of Systems  Review of Systems   Constitutional: Negative for activity change, appetite change, fatigue, fever and unexpected weight change.   Respiratory: Negative for shortness of breath.    Cardiovascular: Negative for chest pain.   Gastrointestinal: Negative for abdominal pain, bloating, blood in stool, constipation, diarrhea, nausea and vomiting.   Genitourinary: Negative for menorrhagia, menstrual problem, vaginal bleeding, vaginal discharge, vaginal pain and vaginal odor.   Musculoskeletal: Negative for back pain.   Neurological: Negative for syncope and headaches.           Objective:    Physical Exam:   Constitutional: She is oriented to person, place, and time. She appears well-developed and well-nourished.                           Neurological: She is alert and oriented to person, place, and time.     Psychiatric: She has a normal mood and affect. Her behavior is  normal. Thought content normal.          Assessment:        1. Encounter for other contraceptive management             Plan:      Encounter for other contraceptive management  -     Pt was counseled on contraception and sterilization options, including associated risks and benefits of each.  Options are limited with contraceptive hormones due to current use of Topamax.  Pt has no desires for future fertility and would be a candidate for sterilization if she desires.  Pt voiced understanding and desires to think about options.  Pt will call back with decision.  In the mean time, pt was advised to abstain from intercourse or use back-up forms of contraception.      Follow-up if symptoms worsen or fail to improve.

## 2018-11-02 RX ORDER — ALBUTEROL SULFATE 90 UG/1
AEROSOL, METERED RESPIRATORY (INHALATION)
Qty: 8.5 G | Refills: 0 | Status: SHIPPED | OUTPATIENT
Start: 2018-11-02 | End: 2018-12-09 | Stop reason: SDUPTHER

## 2018-11-09 DIAGNOSIS — E11.9 TYPE 2 DIABETES MELLITUS WITHOUT COMPLICATION: ICD-10-CM

## 2018-11-19 ENCOUNTER — TELEPHONE (OUTPATIENT)
Dept: RADIOLOGY | Facility: HOSPITAL | Age: 33
End: 2018-11-19

## 2018-11-27 ENCOUNTER — TELEPHONE (OUTPATIENT)
Dept: HEMATOLOGY/ONCOLOGY | Facility: CLINIC | Age: 33
End: 2018-11-27

## 2018-12-03 ENCOUNTER — TELEPHONE (OUTPATIENT)
Dept: RADIOLOGY | Facility: HOSPITAL | Age: 33
End: 2018-12-03

## 2018-12-10 RX ORDER — ALBUTEROL SULFATE 90 UG/1
AEROSOL, METERED RESPIRATORY (INHALATION)
Qty: 8.5 G | Refills: 0 | Status: SHIPPED | OUTPATIENT
Start: 2018-12-10 | End: 2019-08-04 | Stop reason: SDUPTHER

## 2018-12-11 ENCOUNTER — TELEPHONE (OUTPATIENT)
Dept: HEMATOLOGY/ONCOLOGY | Facility: CLINIC | Age: 33
End: 2018-12-11

## 2018-12-11 NOTE — TELEPHONE ENCOUNTER
Left MSG for pt to see if she needed to reschedule her MRI that she missed again and the appt with Mariela noland Np that was cancelled from 12/12/18. To reschedule her MRI call [phone] and ask to speak to radiology to get it rescheduled and to reschedule the appt with Mariela she can talk to the appt desk or reschedule it her self through her [facility].

## 2018-12-11 NOTE — TELEPHONE ENCOUNTER
----- Message from Helen Lang LPN sent at 12/11/2018  8:43 AM CST -----  Apparently the MRI table wasn't working and patient will need to reschedule their MRI.

## 2018-12-30 ENCOUNTER — PATIENT MESSAGE (OUTPATIENT)
Dept: OTOLARYNGOLOGY | Facility: CLINIC | Age: 33
End: 2018-12-30

## 2018-12-31 ENCOUNTER — TELEPHONE (OUTPATIENT)
Dept: RADIOLOGY | Facility: HOSPITAL | Age: 33
End: 2018-12-31

## 2019-01-04 ENCOUNTER — OFFICE VISIT (OUTPATIENT)
Dept: OTOLARYNGOLOGY | Facility: CLINIC | Age: 34
End: 2019-01-04
Payer: MEDICAID

## 2019-01-04 VITALS
TEMPERATURE: 98 F | HEART RATE: 110 BPM | BODY MASS INDEX: 36.32 KG/M2 | WEIGHT: 205 LBS | DIASTOLIC BLOOD PRESSURE: 82 MMHG | SYSTOLIC BLOOD PRESSURE: 131 MMHG

## 2019-01-04 DIAGNOSIS — J01.90 ACUTE BACTERIAL RHINOSINUSITIS: Primary | ICD-10-CM

## 2019-01-04 DIAGNOSIS — B96.89 ACUTE BACTERIAL RHINOSINUSITIS: Primary | ICD-10-CM

## 2019-01-04 DIAGNOSIS — J30.89 NON-SEASONAL ALLERGIC RHINITIS, UNSPECIFIED TRIGGER: ICD-10-CM

## 2019-01-04 PROCEDURE — 99999 PR PBB SHADOW E&M-EST. PATIENT-LVL IV: ICD-10-PCS | Mod: PBBFAC,,, | Performed by: PHYSICIAN ASSISTANT

## 2019-01-04 PROCEDURE — 99214 OFFICE O/P EST MOD 30 MIN: CPT | Mod: S$PBB,,, | Performed by: PHYSICIAN ASSISTANT

## 2019-01-04 PROCEDURE — 99214 PR OFFICE/OUTPT VISIT, EST, LEVL IV, 30-39 MIN: ICD-10-PCS | Mod: S$PBB,,, | Performed by: PHYSICIAN ASSISTANT

## 2019-01-04 PROCEDURE — 99214 OFFICE O/P EST MOD 30 MIN: CPT | Mod: PBBFAC,PO | Performed by: PHYSICIAN ASSISTANT

## 2019-01-04 PROCEDURE — 99999 PR PBB SHADOW E&M-EST. PATIENT-LVL IV: CPT | Mod: PBBFAC,,, | Performed by: PHYSICIAN ASSISTANT

## 2019-01-04 RX ORDER — LEVOCETIRIZINE DIHYDROCHLORIDE 5 MG/1
5 TABLET, FILM COATED ORAL NIGHTLY
Qty: 30 TABLET | Refills: 11 | Status: SHIPPED | OUTPATIENT
Start: 2019-01-04 | End: 2020-01-14

## 2019-01-04 RX ORDER — AZELASTINE 1 MG/ML
1 SPRAY, METERED NASAL 2 TIMES DAILY
Qty: 30 ML | Refills: 11 | Status: SHIPPED | OUTPATIENT
Start: 2019-01-04 | End: 2020-01-30

## 2019-01-04 RX ORDER — METHYLPREDNISOLONE 4 MG/1
TABLET ORAL
Qty: 1 PACKAGE | Refills: 0 | Status: SHIPPED | OUTPATIENT
Start: 2019-01-04 | End: 2019-01-25

## 2019-01-04 RX ORDER — AMOXICILLIN AND CLAVULANATE POTASSIUM 875; 125 MG/1; MG/1
1 TABLET, FILM COATED ORAL EVERY 12 HOURS
Qty: 20 TABLET | Refills: 0 | Status: SHIPPED | OUTPATIENT
Start: 2019-01-04 | End: 2019-01-14

## 2019-01-04 RX ORDER — FLUTICASONE PROPIONATE 50 MCG
2 SPRAY, SUSPENSION (ML) NASAL 2 TIMES DAILY
Qty: 9.9 ML | Refills: 11 | Status: SHIPPED | OUTPATIENT
Start: 2019-01-04 | End: 2020-01-30

## 2019-01-04 NOTE — PATIENT INSTRUCTIONS
PLEASE PERFORM SINUS RINSES 3-4 TIMES DAILY UNTIL YOUR NEXT VISIT.          DIRECTIONS FOR SINUS SALINE RINSE To see demonstration: Enter http://www.youtube.com/watch?[email or web address] into the browser address box, or go to You tube, and under the search box, enter sinus rinse. Click on [facility] Sinus Rinse Video    Step 1    Step 1 Please wash your hands. Fill the clean bottle with the designated volume of warm distilled water, filtered water or previously boiled water. You may warm the water in a microwave but we recommend that you warm it in increments of five seconds. This is to avoid excessive heating of the water and damage to the device or scalding your nasal passage.    Step 2    Step 2 Cut the SINUS RINSE mixture packet at the corner and pour its contents into the bottle. Tighten the cap & tube on the bottle securely, place one finger over the tip of the cap and shake the bottle gently to dissolve the mixture.      Step 3    Step 3 Standing in front of a sink, bend forward to your comfort level and tilt your head down. Keeping your mouth open without holding your breath, place cap snugly against your nasal passage and SQUEEZE BOTTLE GENTLY until the solution starts draining from the OPPOSITE nasal passage or from your mouth. Keep squeezing the bottle GENTLY until at least 1/4 to 1/2 (60 to 120 mL) of the bottle is used for a proper rinse. Do not swallow the solution.    Step 4    Blow your nose gently, without pinching your nose completely because this will apply pressure on the    eardrums. If tolerable, sniff in any residual solution remaining in the nasal passage once or twice prior to    blowing your nose as this may clean out the posterior nasopharyngeal area (the area at the back of your    nasal passage). Some solution will reach the back of the throat, so please spit it out. To help improve    drainage of any residual solution, blow your nose gently while tilting your head to the opposite side  of    the nasal passage that you just rinsed.    Step 5    Now repeat steps 3 & 4 for your other nasal passage.    Step 6 Air dry the Sinus Rinse bottle, cap, and tube on a clean paper towel, a glass plate to store the bottle cap and tube. If there is any solution leftover, please discard it. We recommend you make a fresh solution each time you rinse. Rinse 5 times each day, OR as directed by your physician. Warnings: DO NOT RINSE IF NASAL PASSAGE IS COMPLETELY BLOCKED OR IF YOU HAVE AN EAR INFECTION OR BLOCKED EARS. If you have had ear surgery, please contact your physician prior to irrigation. If you experience any pressure in your ears, stop the rinse and get further directions from your physician or contact our office during regular business hours. To avoid any ear discomfort: Heat the solution to lukewarm, do not use hot, boiling or cold water. Keep your mouth open. Do not hold your breath and if possible make the sound DAYAN....DAYAN... Make sure to take the position as shown. Gently squeeze 1/4 of the bottle at a time (60mL / 2 ounces). Stop the rinse if you feel any solution sensation near the ears. You may rinse with a partially blocked nasal passage. Please do not use for any other purposes. Please rinse at least ONE HOUR PRIOR to bedtime, in order to avoid any residual solution dripping in the throat.    >> Before using the SINUS RINSE kit, please inspect the cap, tube and bottle carefully for wear and    tear. If any of the components appear discolored or cracked, please contact [employer] to obtain a    replacement. You must follow the cleaning instructions provided in this brochure or cleaning instruction    card prior to each use.    >> The SINUS RINSE bottle and mixture are to be used only for nasalirrigation. Do not use for any other    purposes.    >> We recommend that you use the rinse ONE HOUR PRIOR to bedtime in order to avoid any residual    solution dripping in the throat.    Tips to avoid ear  discomfort while rinsing    If you have had ear surgery, please contact your physician prior to irrigation. Do not use if you have an    ear infection or blocked ears. Rinse with lukewarm water. Keep your mouth open. Do not hold your    breath while rinsing. While rinsing, make sure to tilt your. Gently squeeze the bottle while rinsing; do    not squeeze the bottle very forcefully. Stop the rinse if you feel a sensation of fluid near your ears.    Tips to avoid unexpected drainage after rinsing    In rare situations, especially if you have had sinus surgery, the saline solution can pool in the sinus    cavities and nasal passages and then drip from your nostrils hours after rinsing. To avoid this harmless    but annoying inconvenience, take one extra step after rinsing: lean forward, tilt your head sideways and    gently blow your nose. Then, tilt your head to the other side and blow again. You may need to repeat    this several times. This will help rid your nasal passages of any excess mucus and remaining saline    solution. If you find yourself experiencing delayed drainage often, do not rinse right before leaving your    house or going to bed.

## 2019-01-07 NOTE — PROGRESS NOTES
Subjective:   Patient: Rosa Maria Jones [id number], :1985   Visit date:2019 5:09 PM    Chief Complaint:  Sore Throat ([facility] urgent care last monday give steroid inj and z-mina ) and Otalgia    HPI:  Rosa Maria is a 33 y.o. female who is here for evaluation of sinus pain and pressure, otalgia, and nasal congestion x 2 weeks:    MAJOR SYMPTOMS:  Yes  Purulent anterior nasal discharge  Yes  Purulent or discolored posterior nasal discharge  Yes  Nasal congestion or obstruction  Yes  Facial Congestion or fullness  No  Hyposmia or anosmia  No  Fever    MINOR SYMPTOMS:  Yes  Headache  Yes  Ear pain, pressure, or fullness  No  Halitosis  No  Dental pain  Yes  Fatigue    The diagnosis of acute sinusitis is based on the presence of at least 2 major or 1 major and at least 2 minor symptoms.  Rosa Maria does meet this criteria.  Clinical Practice Guideline for Acute Bacterial Rhinosinusitis in Children and Adults. Clin Infect Dis 2012; 54:e72    Onset:  2 weeks ago  Exacerbating factors: No  Relieving factors: No  Timing:  worsening          Review of Systems:  -     Allergic/Immunologic: is allergic to adhesive; dairy aid [lactase]; and latex, natural rubber..  -     Constitutional: Current temp: 97.9 °F (36.6 °C) (Tympanic)    Her meds, allergies, medical, surgical, social & family histories were reviewed & updated:  -     She has a current medication list which includes the following prescription(s): alprazolam, aripiprazole, atorvastatin, dextroamphetamine-amphetamine, diltiazem hcl, ergocalciferol, glimepiride, low-ogestrel (28), metformin, methylphenidate hcl, proair hfa, propranolol, spironolactone, topiramate, trazodone, vyvanse, amoxicillin-clavulanate 875-125mg, azelastine, fluticasone, levocetirizine, and methylprednisolone.  -     She  has a past medical history of Abnormal Pap smear of cervix (), ADHD (attention deficit hyperactivity disorder), Anxiety, Asthma, Depression, Diabetes type 2, controlled,  Insulin resistance, PCOS (polycystic ovarian syndrome), and Tachycardia.   -     She does not have any pertinent problems on file.   -     She  has a past surgical history that includes cold knife cone (2015).  -     She  reports that she has been smoking cigarettes.  She has been smoking about 1.00 pack per day. she has never used smokeless tobacco. She reports that she does not drink alcohol or use drugs.  -     Her family history includes Breast cancer in her maternal grandmother; Cancer in her father; Diabetes in her father; Hypertension in her father.  -     She is allergic to adhesive; dairy aid [lactase]; and latex, natural rubber.    Objective:     Physical Exam:  Vitals:  /82   Pulse 110   Temp 97.9 °F (36.6 °C) (Tympanic)   Wt 93 kg (205 lb 0.4 oz)   BMI 36.32 kg/m²   Appearance:  Well-developed, well-nourished.  Communication:  Able to communicate, no hoarseness.  Head & Face:  Normocephalic, atraumatic, no sinus tenderness, normal facial strength.  Eyes:  Extraocular motions intact.  Ears:  Bilateral TM's are with mild bulging, clear, intact. Bilateral EAC's are WNL.   Nose:  Bilateral inf turbs are with moderate edema and erythema. Nasal cavities are with thick/ green mucus bilaterally. No mass/lesions.   Mouth:  No mass/lesion of lips, teeth, gums, hard/soft palate, tongue, tonsils, or oropharynx.Mild erythema of oropharynx.  Neck & Lymphatics:  No cervical lymphadenopathy, no neck mass/crepitus/ asymmetry, trachea is midline, no thyroid enlargement/tenderness/mass.  Neuro/Psych: Alert with normal mood and affect.   Abdominal: Normal appearance.   Respiration/Chest:  Symmetric expansion during respiration, normal respiratory effort.  Skin:  Warm and intact  Cardiovascular:  No peripheral vascular edema or varicosities.    Assessment & Plan:   Rosa Maria was seen today for sore throat and otalgia.    Diagnoses and all orders for this visit:    Acute bacterial rhinosinusitis    Non-seasonal  allergic rhinitis, unspecified trigger    Other orders  -     amoxicillin-clavulanate 875-125mg (AUGMENTIN) 875-125 mg per tablet; Take 1 tablet by mouth every 12 (twelve) hours. for 10 days  -     levocetirizine (XYZAL) 5 MG tablet; Take 1 tablet (5 mg total) by mouth every evening.  -     fluticasone (FLONASE) 50 mcg/actuation nasal spray; 2 sprays (100 mcg total) by Each Nare route 2 (two) times daily.  -     methylPREDNISolone (MEDROL DOSEPACK) 4 mg tablet; use as directed  -     azelastine (ASTELIN) 137 mcg (0.1 %) nasal spray; 1 spray (137 mcg total) by Nasal route 2 (two) times daily.    Augmentin x 10 days   Medrol Dosepak  Start Xyzal QHS  Start nasal saline rinses followed with Flonase and Astelin BID  Patient wishes to contact us if symptoms do not improve/ resolve for a f/u appt.     Over 25 minutes were spent in face to face contact with the patient with greater than 50% spent discussing their diagnosis and coordination of care.     Debbi Stern, PAC

## 2019-03-19 ENCOUNTER — PATIENT MESSAGE (OUTPATIENT)
Dept: SURGERY | Facility: CLINIC | Age: 34
End: 2019-03-19

## 2019-07-17 ENCOUNTER — PATIENT MESSAGE (OUTPATIENT)
Dept: OBSTETRICS AND GYNECOLOGY | Facility: CLINIC | Age: 34
End: 2019-07-17

## 2019-07-29 ENCOUNTER — PATIENT OUTREACH (OUTPATIENT)
Dept: ADMINISTRATIVE | Facility: HOSPITAL | Age: 34
End: 2019-07-29

## 2019-08-04 RX ORDER — ALBUTEROL SULFATE 90 UG/1
AEROSOL, METERED RESPIRATORY (INHALATION)
Qty: 8.5 G | Refills: 0 | Status: SHIPPED | OUTPATIENT
Start: 2019-08-04 | End: 2020-02-20

## 2019-10-28 ENCOUNTER — TELEPHONE (OUTPATIENT)
Dept: OBSTETRICS AND GYNECOLOGY | Facility: CLINIC | Age: 34
End: 2019-10-28

## 2019-10-28 NOTE — TELEPHONE ENCOUNTER
----- Message from Suly Oh sent at 10/28/2019  3:34 PM CDT -----  Contact: [phone]/self   Patient is requesting to speak with you regarding scheduling her annual appt. Please advise.

## 2019-10-28 NOTE — TELEPHONE ENCOUNTER
Spoke to patient and scheduled her appointment for 11/01/19 at 11:30am to see Dr. Isabel at the [place] location. Patient verbalized understanding.

## 2019-10-29 ENCOUNTER — HOSPITAL ENCOUNTER (OUTPATIENT)
Dept: RADIOLOGY | Facility: HOSPITAL | Age: 34
Discharge: HOME | End: 2019-10-29
Attending: OTOLARYNGOLOGY
Payer: MEDICAID

## 2019-10-29 DIAGNOSIS — E04.1 THYROID NODULE: ICD-10-CM

## 2019-10-29 PROCEDURE — 76536 US EXAM OF HEAD AND NECK: CPT | Mod: 26,,, | Performed by: RADIOLOGY

## 2019-10-29 PROCEDURE — 76536 US SOFT TISSUE HEAD NECK THYROID: ICD-10-PCS | Mod: 26,,, | Performed by: RADIOLOGY

## 2019-10-29 PROCEDURE — 76536 US EXAM OF HEAD AND NECK: CPT | Mod: TC

## 2019-10-30 DIAGNOSIS — E28.2 POLYCYSTIC OVARIAN DISEASE: ICD-10-CM

## 2019-10-30 DIAGNOSIS — E11.9 TYPE 2 DIABETES MELLITUS WITHOUT COMPLICATION, WITHOUT LONG-TERM CURRENT USE OF INSULIN: ICD-10-CM

## 2019-10-30 DIAGNOSIS — Z30.41 ENCOUNTER FOR SURVEILLANCE OF CONTRACEPTIVE PILLS: ICD-10-CM

## 2019-10-30 RX ORDER — SPIRONOLACTONE 50 MG/1
50 TABLET, FILM COATED ORAL DAILY
Qty: 90 TABLET | Refills: 3 | OUTPATIENT
Start: 2019-10-30 | End: 2020-10-29

## 2019-10-30 RX ORDER — GLIMEPIRIDE 2 MG/1
2 TABLET ORAL
Qty: 90 TABLET | Refills: 3 | OUTPATIENT
Start: 2019-10-30 | End: 2020-10-29

## 2019-10-30 RX ORDER — METFORMIN HYDROCHLORIDE 500 MG/1
500 TABLET ORAL 2 TIMES DAILY WITH MEALS
Qty: 180 TABLET | Refills: 3 | OUTPATIENT
Start: 2019-10-30 | End: 2020-10-29

## 2019-10-30 RX ORDER — NORGESTREL AND ETHINYL ESTRADIOL 0.3-0.03MG
1 KIT ORAL DAILY
Qty: 28 TABLET | Refills: 0 | Status: SHIPPED | OUTPATIENT
Start: 2019-10-30 | End: 2020-01-31 | Stop reason: ALTCHOICE

## 2019-10-30 RX ORDER — ALBUTEROL SULFATE 90 UG/1
2 AEROSOL, METERED RESPIRATORY (INHALATION) EVERY 6 HOURS PRN
Qty: 8.5 G | Refills: 3 | OUTPATIENT
Start: 2019-10-30

## 2019-10-30 NOTE — TELEPHONE ENCOUNTER
Pt's last annual 10/2018, pt scheduled for annual exam on 11/1. Pt requesting refill on OCP. Medication pended. Pharmacy up to date. Please advise.

## 2019-11-01 ENCOUNTER — PATIENT MESSAGE (OUTPATIENT)
Dept: OTOLARYNGOLOGY | Facility: CLINIC | Age: 34
End: 2019-11-01

## 2019-11-01 DIAGNOSIS — E04.1 THYROID NODULE: Primary | ICD-10-CM

## 2019-11-19 ENCOUNTER — PATIENT MESSAGE (OUTPATIENT)
Dept: OBSTETRICS AND GYNECOLOGY | Facility: CLINIC | Age: 34
End: 2019-11-19

## 2019-12-20 ENCOUNTER — PATIENT MESSAGE (OUTPATIENT)
Dept: OBSTETRICS AND GYNECOLOGY | Facility: CLINIC | Age: 34
End: 2019-12-20

## 2019-12-30 DIAGNOSIS — Z30.41 ENCOUNTER FOR SURVEILLANCE OF CONTRACEPTIVE PILLS: ICD-10-CM

## 2019-12-30 DIAGNOSIS — E28.2 POLYCYSTIC OVARIAN DISEASE: ICD-10-CM

## 2019-12-30 RX ORDER — NORGESTREL AND ETHINYL ESTRADIOL 0.3-0.03MG
1 KIT ORAL DAILY
Qty: 28 TABLET | Refills: 0 | OUTPATIENT
Start: 2019-12-30

## 2020-01-09 ENCOUNTER — PATIENT MESSAGE (OUTPATIENT)
Dept: OBSTETRICS AND GYNECOLOGY | Facility: CLINIC | Age: 35
End: 2020-01-09

## 2020-01-14 RX ORDER — LEVOCETIRIZINE DIHYDROCHLORIDE 5 MG/1
TABLET, FILM COATED ORAL
Qty: 30 TABLET | Refills: 11 | Status: SHIPPED | OUTPATIENT
Start: 2020-01-14 | End: 2020-05-06

## 2020-01-30 RX ORDER — FLUTICASONE PROPIONATE 50 MCG
SPRAY, SUSPENSION (ML) NASAL
Qty: 16 G | Refills: 11 | Status: SHIPPED | OUTPATIENT
Start: 2020-01-30 | End: 2020-02-29

## 2020-01-30 RX ORDER — AZELASTINE 1 MG/ML
SPRAY, METERED NASAL
Qty: 30 ML | Refills: 11 | Status: SHIPPED | OUTPATIENT
Start: 2020-01-30 | End: 2020-05-06

## 2020-01-31 ENCOUNTER — OFFICE VISIT (OUTPATIENT)
Dept: OBSTETRICS AND GYNECOLOGY | Facility: CLINIC | Age: 35
End: 2020-01-31
Payer: MEDICAID

## 2020-01-31 VITALS
HEIGHT: 63 IN | SYSTOLIC BLOOD PRESSURE: 150 MMHG | WEIGHT: 210.13 LBS | BODY MASS INDEX: 37.23 KG/M2 | DIASTOLIC BLOOD PRESSURE: 88 MMHG

## 2020-01-31 DIAGNOSIS — Z30.41 ENCOUNTER FOR SURVEILLANCE OF CONTRACEPTIVE PILLS: Primary | ICD-10-CM

## 2020-01-31 DIAGNOSIS — Z12.4 PAP SMEAR FOR CERVICAL CANCER SCREENING: ICD-10-CM

## 2020-01-31 PROCEDURE — 99999 PR PBB SHADOW E&M-EST. PATIENT-LVL III: ICD-10-PCS | Mod: PBBFAC,,, | Performed by: OBSTETRICS & GYNECOLOGY

## 2020-01-31 PROCEDURE — 99395 PR PREVENTIVE VISIT,EST,18-39: ICD-10-PCS | Mod: S$PBB,,, | Performed by: OBSTETRICS & GYNECOLOGY

## 2020-01-31 PROCEDURE — 99999 PR PBB SHADOW E&M-EST. PATIENT-LVL III: CPT | Mod: PBBFAC,,, | Performed by: OBSTETRICS & GYNECOLOGY

## 2020-01-31 PROCEDURE — 99395 PREV VISIT EST AGE 18-39: CPT | Mod: S$PBB,,, | Performed by: OBSTETRICS & GYNECOLOGY

## 2020-01-31 PROCEDURE — 88175 CYTOPATH C/V AUTO FLUID REDO: CPT

## 2020-01-31 PROCEDURE — 99213 OFFICE O/P EST LOW 20 MIN: CPT | Mod: PBBFAC | Performed by: OBSTETRICS & GYNECOLOGY

## 2020-01-31 RX ORDER — ACETAMINOPHEN AND CODEINE PHOSPHATE 120; 12 MG/5ML; MG/5ML
1 SOLUTION ORAL DAILY
Qty: 28 TABLET | Refills: 11 | Status: SHIPPED | OUTPATIENT
Start: 2020-01-31 | End: 2022-06-07

## 2020-01-31 NOTE — PROGRESS NOTES
Subjective:       Patient ID: Rosa Maria Jones is a 34 y.o. female.    Chief Complaint:  Annual Exam      History of Present Illness  HPI  Annual Exam-Premenopausal  Patient presents for annual exam. The patient has no complaints today. The patient is sexually active. GYN screening history: last pap: approximate date  and was abnormal: ASCUS HPV + with no dysplasia on colpo. The patient wears seatbelts: yes. The patient participates in regular exercise: no. Has the patient ever been transfused or tattooed?: yes. The patient reports that there is not domestic violence in her life.  Menses are sporadic on current OCP.  Desires to continue use.  Pt had a fall that resulted in bruised rib one week ago.  Has been evaluated already.      GYN & OB History  Patient's last menstrual period was 2020.   Date of Last Pap: 2018    OB History    Para Term  AB Living   1       1     SAB TAB Ectopic Multiple Live Births   1              # Outcome Date GA Lbr Dc/2nd Weight Sex Delivery Anes PTL Lv   1 SAB                Review of Systems  Review of Systems   Constitutional: Negative for activity change, appetite change, chills, fatigue, fever and unexpected weight change.   Respiratory: Negative for shortness of breath.    Cardiovascular: Negative for chest pain, palpitations and leg swelling.   Gastrointestinal: Negative for abdominal pain, bloating, blood in stool, constipation, diarrhea, nausea and vomiting.   Genitourinary: Negative for dysmenorrhea, dyspareunia, dysuria, flank pain, frequency, genital sores, hematuria, menorrhagia, menstrual problem, pelvic pain, urgency, vaginal bleeding, vaginal discharge, vaginal pain, urinary incontinence, postcoital bleeding, vaginal dryness and vaginal odor.   Musculoskeletal: Negative for back pain.   Integumentary:  Negative for breast mass, nipple discharge, breast skin changes and breast tenderness.   Neurological: Negative for syncope and  headaches.   Breast: Negative for asymmetry, lump, mass, mastodynia, nipple discharge, skin changes and tenderness          Objective:    Physical Exam:   Constitutional: She is oriented to person, place, and time. She appears well-developed and well-nourished. No distress.    HENT:   Head: Normocephalic and atraumatic.    Eyes: Pupils are equal, round, and reactive to light. EOM are normal.    Neck: Normal range of motion.    Cardiovascular: Normal rate, regular rhythm and normal heart sounds.     Pulmonary/Chest: Effort normal and breath sounds normal.        Abdominal: Soft. Bowel sounds are normal. She exhibits no distension. There is no tenderness.     Genitourinary: Vagina normal and uterus normal. Pelvic exam was performed with patient supine. There is no rash, tenderness, lesion or injury on the right labia. There is no rash, tenderness, lesion or injury on the left labia. Uterus is not deviated, not enlarged and not tender. Cervix is normal. Right adnexum displays no mass, no tenderness and no fullness. Left adnexum displays no mass, no tenderness and no fullness. No erythema, tenderness or bleeding in the vagina. No foreign body in the vagina. No signs of injury around the vagina. No vaginal discharge found. Cervix exhibits no motion tenderness, no discharge and no friability. Additional cervical findings: pap smear done          Musculoskeletal: Normal range of motion and moves all extremeties. She exhibits no edema or tenderness.       Neurological: She is alert and oriented to person, place, and time.    Skin: Skin is warm and dry.    Psychiatric: She has a normal mood and affect. Her behavior is normal. Thought content normal.          Assessment:        1. Encounter for surveillance of contraceptive pills    2. Pap smear for cervical cancer screening             Plan:      Encounter for surveillance of contraceptive pills  -     norethindrone (ORTHO MICRONOR) 0.35 mg tablet; Take 1 tablet (0.35 mg  total) by mouth once daily.  Dispense: 28 tablet; Refill: 11  -     Pt was counseled on contraception options, including associated risks and benefits of each.  Options are limited due to age, smoker, and use of medications.  Pt was advised on more reliable long active contraceptives (IUD, nexplanon) and sterilization.  Pt voiced understanding and desires to proceed with switch to Micronor.  Medication dosing, side-effects, risks, benefits, and alternatives were discussed.  Medical history was reviewed and pt is a candidate for Micronor use.    Pap smear for cervical cancer screening  -     Liquid-Based Pap Smear, Screening  -     Pt was counseled on cervical/vaginal screening guidelines and recommendations.  Last pap ASCUS HPV + with no dysplasia on colpo on 2018.  If today's pap smear result is negative, next pap smear will be due in 2021.  -     Pt was advised on current breast cancer screening recommendations.  -     Follow up with PCP for routine health maintenance needs.        Follow up in about 1 year (around 1/31/2021).

## 2020-02-20 RX ORDER — ALBUTEROL SULFATE 90 UG/1
AEROSOL, METERED RESPIRATORY (INHALATION)
Qty: 8.5 G | Refills: 0 | Status: SHIPPED | OUTPATIENT
Start: 2020-02-20 | End: 2021-06-14 | Stop reason: SDUPTHER

## 2020-02-27 LAB
FINAL PATHOLOGIC DIAGNOSIS: NORMAL
Lab: NORMAL

## 2020-05-04 ENCOUNTER — OFFICE VISIT (OUTPATIENT)
Dept: FAMILY MEDICINE | Facility: CLINIC | Age: 35
End: 2020-05-04
Payer: MEDICAID

## 2020-05-04 DIAGNOSIS — R20.0 BILATERAL HAND NUMBNESS: Primary | ICD-10-CM

## 2020-05-04 PROCEDURE — 99213 PR OFFICE/OUTPT VISIT, EST, LEVL III, 20-29 MIN: ICD-10-PCS | Mod: 95,,, | Performed by: FAMILY MEDICINE

## 2020-05-04 PROCEDURE — 99213 OFFICE O/P EST LOW 20 MIN: CPT | Mod: 95,,, | Performed by: FAMILY MEDICINE

## 2020-05-04 NOTE — PROGRESS NOTES
Chief Complaint:  No chief complaint on file.      History of Present Illness:    Is worse or visit  She is complaining of pain involving bilateral hands and numbness.  She says the hands a getting weak and she is dropping things.  No neck pain.  This is been going on for the past many months no trauma.  Sometimes her knuckles also get sore.  And the swell up    ROS:  Review of Systems    Past Medical History:   Diagnosis Date    Abnormal Pap smear of cervix 2015    [facility] with what sounds pt had [facility] all paps since normal    ADHD (attention deficit hyperactivity disorder)     Anxiety     Asthma     Depression     Diabetes type 2, controlled     Insulin resistance     PCOS (polycystic ovarian syndrome)     Tachycardia        Social History:  Social History     Socioeconomic History    Marital status:      Spouse name: Not on file    Number of children: Not on file    Years of education: Not on file    Highest education level: Not on file   Occupational History    Not on file   Social Needs    Financial resource strain: Not on file    Food insecurity:     Worry: Not on file     Inability: Not on file    Transportation needs:     Medical: Not on file     Non-medical: Not on file   Tobacco Use    Smoking status: Current Every Day Smoker     Packs/day: 1.00     Types: Cigarettes    Smokeless tobacco: Never Used   Substance and Sexual Activity    Alcohol use: No    Drug use: No    Sexual activity: Yes     Partners: Male     Birth control/protection: OCP   Lifestyle    Physical activity:     Days per week: Not on file     Minutes per session: Not on file    Stress: Not on file   Relationships    Social connections:     Talks on phone: Not on file     Gets together: Not on file     Attends Jain service: Not on file     Active member of club or organization: Not on file     Attends meetings of clubs or organizations: Not on file     Relationship status: Not on file   Other  Topics Concern    Not on file   Social History Narrative    Not on file       Family History:   family history includes Breast cancer in her maternal grandmother; Cancer in her father; Diabetes in her father; Hypertension in her father.    Health Maintenance   Topic Date Due    Pneumococcal Vaccine (Medium Risk) (1 of 1 - PPSV23) 05/16/2004    TETANUS VACCINE  03/19/2011    Pap Smear  01/31/2021    Lipid Panel  Completed       Physical Exam:     ]    There is no height or weight on file to calculate BMI.    Physical Exam      Assessment:      ICD-10-CM ICD-9-CM   1. Bilateral hand numbness R20.0 782.0         Plan:        Will get a nerve conduction study also recommend that she come into the clinic and be checked for inflammatory arthritis    The patient location is: Home   The chief complaint leading to consultation is: as below  Visit type: Virtual visit with synchronous audio and video  Total time spent with patient: 20+ mins  Each patient to whom he or she provides medical services by telemedicine is:  (1) informed of the relationship between the physician and patient and the respective role of any other health care provider with respect to management of the patient; and (2) notified that he or she may decline to receive medical services by telemedicine and may withdraw from such care at any time.    Notes: see below        Orders Placed This Encounter   Procedures    Nerve conduction test       Current Outpatient Medications   Medication Sig Dispense Refill    albuterol (PROVENTIL/VENTOLIN HFA) 90 mcg/actuation inhaler INHALE 1 TO 2 PUFFS BY MOUTH EVERY 6 HOURS AS NEEDED 8.5 g 0    alprazolam (XANAX) 1 MG tablet Take 1 tablet by mouth once daily.  0    ARIPiprazole (ABILIFY) 5 MG Tab       atorvastatin (LIPITOR) 20 MG tablet Take 1 tablet (20 mg total) by mouth once daily. 90 tablet 3    azelastine (ASTELIN) 137 mcg (0.1 %) nasal spray SPRAY ONCE IN EACH NOSTRIL TWICE DAILY 30 mL 11     dextroamphetamine-amphetamine (ADDERALL) 15 mg tablet       diltiaZEM HCl (TIAZAC) 120 mg 24 hr capsule TK 1 C PO QD  11    glimepiride (AMARYL) 2 MG tablet Take 1 tablet (2 mg total) by mouth before breakfast. 90 tablet 3    levocetirizine (XYZAL) 5 MG tablet TAKE 1 TABLET BY MOUTH EVERY EVENING 30 tablet 11    metFORMIN (GLUCOPHAGE) 500 MG tablet Take 1 tablet (500 mg total) by mouth 2 (two) times daily with meals. 180 tablet 3    methylphenidate HCl (CONCERTA) 18 MG CR tablet TK 1 T PO QD  0    norethindrone (ORTHO MICRONOR) 0.35 mg tablet Take 1 tablet (0.35 mg total) by mouth once daily. 28 tablet 11    propranolol (INDERAL) 10 MG tablet TK 1 T PO  TID 90 tablet 3    spironolactone (ALDACTONE) 50 MG tablet Take 1 tablet (50 mg total) by mouth once daily. 90 tablet 3    topiramate (TOPAMAX) 100 MG tablet       traZODone (DESYREL) 100 MG tablet TK 1 T PO HS  0    VYVANSE 40 mg Cap TK ONE C PO QD  0     No current facility-administered medications for this visit.        There are no discontinued medications.    No follow-ups on file.      Venkata Ace MD

## 2020-05-06 ENCOUNTER — OFFICE VISIT (OUTPATIENT)
Dept: PHYSICAL MEDICINE AND REHAB | Facility: CLINIC | Age: 35
End: 2020-05-06
Payer: MEDICAID

## 2020-05-06 VITALS
DIASTOLIC BLOOD PRESSURE: 101 MMHG | WEIGHT: 210 LBS | HEART RATE: 96 BPM | RESPIRATION RATE: 14 BRPM | HEIGHT: 63 IN | BODY MASS INDEX: 37.21 KG/M2 | SYSTOLIC BLOOD PRESSURE: 156 MMHG

## 2020-05-06 DIAGNOSIS — G56.03 BILATERAL CARPAL TUNNEL SYNDROME: ICD-10-CM

## 2020-05-06 PROCEDURE — 95911 NRV CNDJ TEST 9-10 STUDIES: CPT | Mod: 26,S$PBB,, | Performed by: PHYSICAL MEDICINE & REHABILITATION

## 2020-05-06 PROCEDURE — 95911 NRV CNDJ TEST 9-10 STUDIES: CPT | Mod: PBBFAC | Performed by: PHYSICAL MEDICINE & REHABILITATION

## 2020-05-06 PROCEDURE — 99999 PR PBB SHADOW E&M-EST. PATIENT-LVL IV: CPT | Mod: PBBFAC,,, | Performed by: PHYSICAL MEDICINE & REHABILITATION

## 2020-05-06 PROCEDURE — 99999 PR PBB SHADOW E&M-EST. PATIENT-LVL IV: ICD-10-PCS | Mod: PBBFAC,,, | Performed by: PHYSICAL MEDICINE & REHABILITATION

## 2020-05-06 PROCEDURE — 99204 PR OFFICE/OUTPT VISIT, NEW, LEVL IV, 45-59 MIN: ICD-10-PCS | Mod: 25,S$PBB,, | Performed by: PHYSICAL MEDICINE & REHABILITATION

## 2020-05-06 PROCEDURE — 95911 PR NERVE CONDUCTION STUDY; 9-10 STUDIES: ICD-10-PCS | Mod: 26,S$PBB,, | Performed by: PHYSICAL MEDICINE & REHABILITATION

## 2020-05-06 PROCEDURE — 99204 OFFICE O/P NEW MOD 45 MIN: CPT | Mod: 25,S$PBB,, | Performed by: PHYSICAL MEDICINE & REHABILITATION

## 2020-05-06 PROCEDURE — 99214 OFFICE O/P EST MOD 30 MIN: CPT | Mod: PBBFAC | Performed by: PHYSICAL MEDICINE & REHABILITATION

## 2020-05-06 RX ORDER — RISPERIDONE 2 MG/1
1 TABLET ORAL NIGHTLY
COMMUNITY
Start: 2020-04-23

## 2020-05-06 RX ORDER — LORAZEPAM 1 MG/1
1 TABLET ORAL DAILY PRN
COMMUNITY
Start: 2020-01-31

## 2020-05-06 RX ORDER — FLUOXETINE HYDROCHLORIDE 20 MG/1
CAPSULE ORAL
COMMUNITY
Start: 2020-04-23 | End: 2020-07-08

## 2020-05-06 NOTE — LETTER
May 6, 2020      Venkata Ace MD  [address] [address] [address]           [facility]  [address]  [place] [address]  Phone: [phone]  Fax: [phone]          Patient: Rosa Maria Jones   MR Number: [id number]   YOB: 1985   Date of Visit: 5/6/2020       Dear Dr. Venkata Ace:    Thank you for referring Rosa Maria Jones to me for evaluation. Attached you will find relevant portions of my assessment and plan of care.    If you have questions, please do not hesitate to call me. I look forward to following Rosa Maria Jones along with you.    Sincerely,    Yoanna Mireles MD    Enclosure  CC:  No Recipients    If you would like to receive this communication electronically, please contact [email or web address] or [phone] to request more information on [facility] Link access.    For providers and/or their staff who would like to refer a patient to [facility], please contact us through our one-stop-shop provider referral line, [facility], [place], at [phone].    If you feel you have received this communication in error or would no longer like to receive these types of communications, please e-mail [email or web address]

## 2020-05-06 NOTE — PATIENT INSTRUCTIONS
Carpal Tunnel Syndrome    Carpal tunnel syndrome is a painful condition of the wrist and arm. It is caused by pressure on the median nerve.  The median nerve is one of the nerves that give feeling and movement to the hand. It passes through a tunnel in the wrist called the carpal tunnel. This tunnel is made up of bones and ligaments. Narrowing of this tunnel or swelling of the tissues inside the tunnel puts pressure on the median nerve. This causes numbness, pins and needles, or electric shooting pains in your hand and forearm. Often the pain is worse at night and may wake you when you are asleep.  Carpal tunnel syndrome may occur during pregnancy and with use of birth control pills. It is more common in workers who must often bend their wrists. It is also common in people who work with power tools that cause strong vibrations.  Home care  · Rest the painful wrist. Avoid repeated bending of the wrist back and forth. This puts pressure on the median nerve. Avoid using power tools with strong vibrations.  · If you were given a splint, wear it at night while you sleep. You may also wear it during the day for comfort.  · Move your fingers and wrists often to avoid stiffness.  · Elevate your arms on pillows when you lie down.  · Try using the unaffected hand more.  · Try not to hold your wrists in a bent, downward position.  · Sometimes changes in the work place may ease symptoms. If you type most of the day, it may help to change the position of your keyboard or add a wrist support. Your wrist should be in a neutral position and not bent back when typing.  · You may use over-the-counter pain medicine to treat pain and inflammation, unless another medicine was prescribed. Anti-inflammatory pain medicines, such as ibuprofen or naproxen may be more effective than acetaminophen, which treats pain, but not inflammation. If you have chronic liver or kidney disease or ever had a stomach ulcer or GI bleeding, talk with your  doctor before using these medicines.  · Opioid pain medicine will only give temporary relief and does not treat the problem. If pain continues, you may need a shot of a steroid drug into your wrist.  · If the above methods fail, you may need surgery. This will open the carpal tunnel and release the pressure on the trapped nerve.  Follow-up care  Follow up with your healthcare provider, or as advised, if the pain doesnt begin to improve within the next week.  If X-rays were taken, you will be notified of any new findings that may affect your care.  When to seek medical advice  Call your healthcare provider right away if any of these occur:  · Pain not improving with the above treatment  · Fingers or hand become cold, blue, numb, or tingly  · Your whole arm becomes swollen or weak  Date Last Reviewed: 11/23/2015  © 7111-7289 [employer]. [address] [place] [address]. All rights reserved. This information is not intended as a substitute for professional medical care. Always follow your healthcare professional's instructions.        Carpal Tunnel Syndrome Prevention Tips  Some repetitive hand activities put you at higher risk for carpal tunnel syndrome (CTS). But you can reduce your risk. Learn how to change the way you use your hands. Below are tips for at home and on the job. Be sure to also follow the hand and wrist safety policies at your workplace.      Keep your wrist in a neutral (straight) position when exercising.      Keep your wrist in neutral  Keep a neutral (straight) wrist position as often as you can. Dont use your wrist in a bent (flexed) position for long periods of time. This includes extended or twisted positions.  Watch your   Dont just use your thumb and index finger to grasp or lift. This can put stress on your wrist. When you can, use your whole hand and all its fingers to grasp an object.  Minimize repetition  Dont move your arms or hands or hold an object in  the same way for long periods of time. Even simple, light tasks can cause injury this way. Instead, alternate tasks or switch hands.  Rest your hands  Give your hands a break from time to time with a rest. Even a few minutes once an hour can help.  Reduce speed and force  Slow down the speed in which you do a forceful, repetitive motion. This gives your wrist time to recover from the effort. Use power tools to help reduce the force.  Strengthen the muscles  Weak muscles may lead to a poor wrist or arm position. Exercises will make your hand and arm muscles stronger. This can help you keep a better position.  Date Last Reviewed: 9/11/2015 © 2000-2017 [employer]. [address] [place] [address], [place] [address]. All rights reserved. This information is not intended as a substitute for professional medical care. Always follow your healthcare professional's instructions.        Understanding Carpal Tunnel Syndrome    The carpal tunnel is a narrow space inside the wrist. It is ringed by bone and a band of tough tissue called the transverse carpal ligament. A major nerve called the median nerve runs from the forearm into the hand through the carpal tunnel. Tendons also run through the carpal tunnel.  With carpal tunnel syndrome, the tendons or nearby tissues within the carpal tunnel may swell or thicken. Or the transverse carpal ligament may harden and shorten. This narrows the space in the carpal tunnel and puts pressure on the median nerve. This pressure leads to tingling and numbness of the hand and wrist. In time, the condition can make even simple tasks hard to do.  What causes carpal tunnel syndrome?  Doctors arent entirely clear why the condition occurs. Certain things may make a person more likely to have it. These include:  · Being female  · Being pregnant  · Being overweight  · Having diabetes or rheumatoid arthritis  Symptoms of carpal tunnel syndrome  Symptoms often come and go. At first, symptoms  may occur mainly at night. Later, they may be noticed during the day as well. They may get worse with activities such as driving, reading, typing, or holding a phone. Symptoms can include:  · Tingling and numbness in the hand or wrist  · Sharp pain that shoots up the arm or down to the fingers  · Hand stiffness or cramping, especially in the morning  · Trouble making a fist  · Hand weakness and clumsiness  Treatment for carpal tunnel syndrome  Certain treatments help reduce the pressure on the median nerve and relieve symptoms. Choices for treatment may include one or more of the following:  · Wrist splint. This involves wearing a special brace on the wrist and hand. The splint holds the wrist straight, in a neutral position. This helps keep the carpal tunnel as open as possible.  · Cortisone shots. Cortisone is a medicine that helps reduce swelling. It is injected directly into the wrist. It helps shrink tissues inside the carpal tunnel. This relieves symptoms for a time.  · Pain medicines. You may take over-the-counter or prescription medicines to help reduce swelling and relieve symptoms.  · Surgery. If the condition doesnt respond to other treatments and doesnt go away on its own, you may need surgery. During surgery, the surgeon cuts the transverse carpal ligament to relieve pressure on the median nerve.     When to call your healthcare provider  Call your healthcare provider right away if you have any of these:  · Fever of 100.4°F (38°C) or higher, or as directed  · Symptoms that dont get better, or get worse  · New symptoms   Date Last Reviewed: 3/10/2016  © 6813-9201 [employer]. [address] [place] [address], [place] [address]. All rights reserved. This information is not intended as a substitute for professional medical care. Always follow your healthcare professional's instructions.        Carpal Tunnel Release Surgery  Surgery may be done if your carpal tunnel syndrome (CTS) symptoms become  severe. Or, you may have surgery if no other treatment brings relief. There are 2 types of CTS procedures. You will be told about the one you will have. Youll also be instructed how to prepare for it.      The goals of surgery  Two types of surgery--open and endoscopic--are used to treat CTS.  · With open surgery, your surgeon makes one incision in your palm. Standard surgical tools are used.  · With endoscopic surgery, one or two small incisions may be made in your hand. A scope (with a very small camera attached) and tools are inserted under the carpal ligament. The surgeon then operates while watching images on a video screen. No matter which one you have, the goal remains the same: Your surgeon will relieve pressure on the median nerve. To do this, the transverse carpal ligament is cut (released).  After surgery  If youve had carpal tunnel surgery, you will spend a few hours resting before you go home. The nerve sensation and circulation in your hand will be checked at this time. For the safest healing, keep the following in mind.  · Keep your hand raised above heart level. This will help reduce swelling.  · Limit hand and wrist use as instructed. A wrist brace may be required.  · Take any pain medication as directed.  · Do hand exercises as directed by your surgeon or therapist.  When to call the surgeon  Call your surgeon if you notice any of the following:  · White or pale-blue hand or nails (If you pinch your skin or nail and the color doesnt return)  · Pain that is not relieved by prescribed medicine  · Loss of sensation or excess swelling in hand or fingers  · Fever over 100.4°F (38°C)   Date Last Reviewed: 9/11/2015  © 2149-2839 [employer]. [address] [place] [address]. All rights reserved. This information is not intended as a substitute for professional medical care. Always follow your healthcare professional's instructions.

## 2020-05-06 NOTE — PROGRESS NOTES
[facility]   [address]  [place] [address]  Phone: [phone]        Full Name: Rosa Maria Jones YOB: 1985  Patient ID: [id number]      Visit Date: 5/6/2020 15:10  Age: 34 Years 11 Months Old  Examining Physician: Yoanna Mireles M.D.  Referring Physician: Dr Ace  Reason for Referral: hand numbness        Chief Complaint   Patient presents with    Numbness     hands     HPI: This is a 34 y.o.  female being seen in clinic today for evaluation of chronic hand pain with numbness, tingling into her fingers and occasional swelling.  She admits to some weakness and issues with dropping items.  She adjusts her hands/arms or rubs them for relief.  Her symptoms are the same left to right depending on the day.    History obtained from patient    Past family, medical, social, and surgical history reviewed in chart    Review of Systems:     General- denies lethargy, weight change, fever, chills  Head/neck- denies swallowing difficulties  ENT- denies hearing changes  Cardiovascular-denies chest pain  Pulmonary- denies shortness of breath  GI- denies constipation or bowel incontinence  - denies bladder incontinence  Skin- denies wounds or rashes  Musculoskeletal- +weakness, +pain  Neurologic- +numbness and tingling  Psychiatric- denies depressive or psychotic features, denies anxiety  Lymphatic-denies swelling  Endocrine- denies hypoglycemic symptoms/+DM history  All other pertinent systems negative     Physical Examination:  General: Well developed, well nourished female, NAD  HEENT:NCAT EOMI bilaterally   Pulmonary:Normal respirations    Spinal Examination: CERVICAL  Active ROM is within normal limits.  Inspection: No deformity of spinal alignment.  Palpation: No vertebral tenderness to percussion.      Spinal Examination: LUMBAR or THORACIC  Active ROM is within normal limits.    Musculoskeletal Tests:  Phalen:   Elbow compression (ulnar): neg  Tinels at wrist:  +bilaterally    Bilateral Upper and Lower Extremities:  Pulses are 2+ at radial bilaterally.  Shoulder/Elbow/Wrist/Hand ROM wnl   Hip/Knee/Ankle ROM   Bilateral Extremities show normal capillary refill.  No signs of cyanosis, rubor, edema, skin changes, or dysvascular changes of appendages.  Nails appear intact.    Neurological Exam:  Cranial Nerves:  II-XII grossly intact    Manual Muscle Testing: (Motor 5=normal)  5/5 strength bilateral upper extremities    No focal atrophy is noted of either upper extremity.    Bilateral Reflexes:hypo at bic tric br  Suarez's response is absent bilaterally.    Sensation: tested to light touch  - intact in arms  Gait: Narrow base and good arm swing.      Entire procedure explained to patient prior to proceeding.  Verbal consent obtained      SNC      Nerve / Sites Rec. Site Onset Lat Peak Lat Amp Segments Distance Velocity     ms ms µV  mm m/s   R Median - Digit II (Antidromic)      Wrist Dig II 4.3 5.3 19.1 Wrist - Dig  33   L Median - Digit II (Antidromic)      Wrist Dig II 4.3 5.7 18.5 Wrist - Dig  32   R Ulnar - Digit V (Antidromic)      Wrist Dig V 2.5 3.2 14.8 Wrist - Dig V 140 56   L Ulnar - Digit V (Antidromic)      Wrist Dig V 2.4 3.3 19.1 Wrist - Dig V 140 58   R Radial - Anatomical snuff box (Forearm)      Forearm Wrist 1.4 2.0 24.1 Forearm - Wrist 100 71   L Radial - Anatomical snuff box (Forearm)      Forearm Wrist 1.4 2.0 18.7 Forearm - Wrist 100 74       MNC      Nerve / Sites Muscle Latency Amplitude Duration Rel Amp Segments Distance Lat Diff Velocity     ms mV ms %  mm ms m/s   R Median - APB      Wrist APB 5.8 7.8 6.5 100 Wrist - APB 80        Elbow APB 8.9 8.2 7.3 105 Elbow - Wrist 190 3.0 63   L Median - APB      Wrist APB 5.5 9.3 6.0 100 Wrist - APB 80        Elbow APB 9.0 9.3 6.5 100 Elbow - Wrist 190 3.5 54   R Ulnar - ADM      Wrist ADM 2.6 9.9 6.5 100 Wrist - ADM 80        B.Elbow ADM 6.2 7.7 6.7 78.6 B.Elbow - Wrist 200 3.6 56      A.Elbow  ADM 8.1 7.9 7.3 102 A.Elbow - B.Elbow 100 1.9 52         A.Elbow - Wrist  5.5    L Ulnar - ADM      Wrist ADM 2.7 12.1 5.7 100 Wrist - ADM 80        B.Elbow ADM 6.4 10.0 6.4 82.8 B.Elbow - Wrist 190 3.8 51      A.Elbow ADM 8.4 9.7 6.7 97.1 A.Elbow - B.Elbow 100 2.0 51         A.Elbow - Wrist  5.7                                           INTERPRETATION  -Bilateral median motor nerve conduction study showed prolonged latency, normal amplitude, and conduction velocity  -Bilateral median sensory nerve conduction study showed prolonged peak latency and normal amplitude  -Bilateral ulnar motor nerve conduction study showed normal latency, amplitude, and conduction velocity  -Bilateral ulnar sensory nerve conduction study showed normal peak latency and amplitude  -Bilateral radial sensory nerve conduction study showed normal peak latency and amplitude      IMPRESSION  1. ABNORMAL study  2. There is electrodiagnostic evidence of a MODERATE demyelinating median neuropathy (Carpal tunnel syndrome) across BILATERAL wrists    PLAN  1. Follow up with referring provider: Dr. Venkata Ace  2. Handouts on CTS provided. Ref to orthopedics for CTR eval-bilaterally. Pt advised to cont braces and gabapentin   3. This study is good for one year. If symptoms worsen or do not improve, please re-consult.    Yoanna Mireles M.D.  Physical Medicine and Rehab

## 2020-05-07 ENCOUNTER — TELEPHONE (OUTPATIENT)
Dept: ORTHOPEDICS | Facility: CLINIC | Age: 35
End: 2020-05-07

## 2020-05-13 ENCOUNTER — OFFICE VISIT (OUTPATIENT)
Dept: ORTHOPEDICS | Facility: CLINIC | Age: 35
End: 2020-05-13
Payer: MEDICAID

## 2020-05-13 ENCOUNTER — HOSPITAL ENCOUNTER (OUTPATIENT)
Dept: RADIOLOGY | Facility: HOSPITAL | Age: 35
Discharge: HOME | End: 2020-05-13
Attending: ORTHOPAEDIC SURGERY
Payer: MEDICAID

## 2020-05-13 VITALS
WEIGHT: 210 LBS | HEIGHT: 63 IN | DIASTOLIC BLOOD PRESSURE: 97 MMHG | BODY MASS INDEX: 37.21 KG/M2 | SYSTOLIC BLOOD PRESSURE: 150 MMHG | HEART RATE: 113 BPM

## 2020-05-13 DIAGNOSIS — M79.642 PAIN IN BOTH HANDS: Primary | ICD-10-CM

## 2020-05-13 DIAGNOSIS — G56.03 BILATERAL CARPAL TUNNEL SYNDROME: Primary | ICD-10-CM

## 2020-05-13 DIAGNOSIS — G56.01 CARPAL TUNNEL SYNDROME OF RIGHT WRIST: ICD-10-CM

## 2020-05-13 DIAGNOSIS — M77.11 LATERAL EPICONDYLITIS OF RIGHT ELBOW: ICD-10-CM

## 2020-05-13 DIAGNOSIS — M79.641 PAIN IN BOTH HANDS: Primary | ICD-10-CM

## 2020-05-13 DIAGNOSIS — G56.03 BILATERAL CARPAL TUNNEL SYNDROME: ICD-10-CM

## 2020-05-13 DIAGNOSIS — M79.642 PAIN IN BOTH HANDS: ICD-10-CM

## 2020-05-13 DIAGNOSIS — M79.641 PAIN IN BOTH HANDS: ICD-10-CM

## 2020-05-13 DIAGNOSIS — Z01.818 PREOP TESTING: Primary | ICD-10-CM

## 2020-05-13 PROCEDURE — 99999 PR PBB SHADOW E&M-EST. PATIENT-LVL III: ICD-10-PCS | Mod: PBBFAC,,, | Performed by: ORTHOPAEDIC SURGERY

## 2020-05-13 PROCEDURE — 99999 PR PBB SHADOW E&M-EST. PATIENT-LVL III: CPT | Mod: PBBFAC,,, | Performed by: ORTHOPAEDIC SURGERY

## 2020-05-13 PROCEDURE — 99213 OFFICE O/P EST LOW 20 MIN: CPT | Mod: PBBFAC,25 | Performed by: ORTHOPAEDIC SURGERY

## 2020-05-13 PROCEDURE — 99203 OFFICE O/P NEW LOW 30 MIN: CPT | Mod: S$PBB,,, | Performed by: ORTHOPAEDIC SURGERY

## 2020-05-13 PROCEDURE — 73130 X-RAY EXAM OF HAND: CPT | Mod: TC,50

## 2020-05-13 PROCEDURE — 73130 XR HAND COMPLETE 3 VIEWS BILATERAL: ICD-10-PCS | Mod: 26,50,, | Performed by: RADIOLOGY

## 2020-05-13 PROCEDURE — 99203 PR OFFICE/OUTPT VISIT, NEW, LEVL III, 30-44 MIN: ICD-10-PCS | Mod: S$PBB,,, | Performed by: ORTHOPAEDIC SURGERY

## 2020-05-13 PROCEDURE — 73130 X-RAY EXAM OF HAND: CPT | Mod: 26,50,, | Performed by: RADIOLOGY

## 2020-05-13 NOTE — H&P (VIEW-ONLY)
Subjective:     Patient ID: Rosa Maria Jones is a 34 y.o. female.    Chief Complaint: Pain and Numbness of the Left Wrist; Pain and Numbness of the Right Wrist; and Pain of the Right Elbow    The patient is a 34-year-old female with bilateral carpal tunnel syndrome documented by nerve conduction studies.  She wishes to have a right carpal tunnel release.  She has tried splinting without improvement.  The numbness is constant rather than intermittent.      Past Medical History:   Diagnosis Date    Abnormal Pap smear of cervix 2015    [facility] 3 with what sounds pt had [facility] all paps since normal    ADHD (attention deficit hyperactivity disorder)     Anxiety     Asthma     Depression     Diabetes type 2, controlled     Insulin resistance     PCOS (polycystic ovarian syndrome)     Tachycardia      Past Surgical History:   Procedure Laterality Date    cold knife cone  2015    [facility] - pap f/u was normal      Family History   Problem Relation Age of Onset    Diabetes Father     Cancer Father         Salivary gland    Hypertension Father     Breast cancer Maternal Grandmother      Social History     Socioeconomic History    Marital status:      Spouse name: Not on file    Number of children: Not on file    Years of education: Not on file    Highest education level: Not on file   Occupational History    Not on file   Social Needs    Financial resource strain: Not on file    Food insecurity:     Worry: Not on file     Inability: Not on file    Transportation needs:     Medical: Not on file     Non-medical: Not on file   Tobacco Use    Smoking status: Current Every Day Smoker     Packs/day: 1.00     Types: Cigarettes    Smokeless tobacco: Never Used   Substance and Sexual Activity    Alcohol use: No    Drug use: No    Sexual activity: Yes     Partners: Male     Birth control/protection: OCP   Lifestyle    Physical activity:      Days per week: Not on file     Minutes per session: Not on file    Stress: Not on file   Relationships    Social connections:     Talks on phone: Not on file     Gets together: Not on file     Attends Yarsani service: Not on file     Active member of club or organization: Not on file     Attends meetings of clubs or organizations: Not on file     Relationship status: Not on file   Other Topics Concern    Not on file   Social History Narrative    Not on file     Medication List with Changes/Refills   Current Medications    ALBUTEROL (PROVENTIL/VENTOLIN HFA) 90 MCG/ACTUATION INHALER    INHALE 1 TO 2 PUFFS BY MOUTH EVERY 6 HOURS AS NEEDED    ALPRAZOLAM (XANAX) 1 MG TABLET    Take 1 tablet by mouth once daily.    DEXTROAMPHETAMINE-AMPHETAMINE (ADDERALL) 15 MG TABLET        FLUOXETINE 20 MG CAPSULE        LORAZEPAM (ATIVAN) 1 MG TABLET        NORETHINDRONE (ORTHO MICRONOR) 0.35 MG TABLET    Take 1 tablet (0.35 mg total) by mouth once daily.    NORGESTREL-ETHINYL ESTRADIOL (LO/OVRAL) 0.3-30 MG-MCG PER TABLET    Take 1 tablet by mouth.    RISPERIDONE (RISPERDAL) 2 MG TABLET         Review of patient's allergies indicates:   Allergen Reactions    Adhesive Blisters, Hives and Itching    Dairy aid [lactase]     Latex, natural rubber      Review of Systems   Constitution: Positive for malaise/fatigue.   HENT: Negative for hearing loss.    Eyes: Negative for double vision and visual disturbance.   Cardiovascular: Negative for chest pain.   Respiratory: Positive for wheezing. Negative for shortness of breath.    Endocrine: Negative for cold intolerance.   Hematologic/Lymphatic: Does not bruise/bleed easily.   Skin: Negative for poor wound healing and suspicious lesions.   Musculoskeletal: Negative for gout, joint pain and joint swelling.   Gastrointestinal: Negative for nausea and vomiting.   Genitourinary: Negative for dysuria.   Neurological: Negative for numbness, paresthesias and sensory change.    Psychiatric/Behavioral: Negative for depression, memory loss and substance abuse. The patient is not nervous/anxious.    Allergic/Immunologic: Negative for persistent infections.       Objective:   Body mass index is 37.2 kg/m².  Vitals:    05/13/20 0857   BP: (!) 150/97   Pulse: (!) 113                General    Constitutional: She is oriented to person, place, and time. She appears well-developed and well-nourished. No distress.   HENT:   Head: Normocephalic.   Mouth/Throat: Oropharynx is clear and moist.   Eyes: EOM are normal.   Neck: Normal range of motion.   Cardiovascular: Normal rate.    Pulmonary/Chest: Effort normal.   Abdominal: Soft.   Neurological: She is alert and oriented to person, place, and time. No cranial nerve deficit.   Psychiatric: She has a normal mood and affect.             Right Hand/Wrist Exam     Inspection   Scars: Wrist - absent Hand -  absent  Effusion: Wrist - absent Hand -  absent    Pain   Wrist - The patient exhibits pain of the flexor/pronator group.    Tests   Phalens sign: positive  Tinel's sign (median nerve): positive  Carpal Tunnel Compression Test: positive    Atrophy   Thenar:  negative  Intrinsic:  negative    Other     Neuorologic Exam    Median Distribution: abnormal  Ulnar Distribution: normal  Radial Distribution: normal    Comments:  The patient has a positive Tinel and positive Phalen sign.  There is no thenar atrophy noted.      Left Hand/Wrist Exam     Inspection   Scars: Wrist - absent Hand -  absent  Effusion: Wrist - absent Hand -  absent    Pain   Wrist - The patient exhibits pain of the flexor/pronator group.    Tests   Phalens sign: positive  Tinel's sign (median nerve): positive  Carpal Tunnel Compression Test: positive    Atrophy  Thenar:  Negative  Intrinsic: negative    Other     Sensory Exam  Median Distribution: abnormal  Ulnar Distribution: normal  Radial Distribution: normal    Comments:  The patient has a positive Tinel and positive Phalen sign.   There is no thenar atrophy noted.          Vascular Exam       Capillary Refill  Right Hand: normal capillary refill  Left Hand: normal capillary refill      Relevant imaging results reviewed and interpreted by me, discussed with the patient and / or family today radiographs of both wrist were normal  Assessment:     Encounter Diagnosis   Name Primary?    Bilateral carpal tunnel syndrome         Plan:     The patient wished to have a right carpal tunnel release.  She was counseled regarding the surgery.  Risk complications and alternatives were discussed including the risk of infection, anesthetic risk, injury to nerves and vessels, loss of motion, and possible need for additional surgeries were discussed.  She seems to understand agree that surgery.  All questions were answered.                Disclaimer: This note was prepared using a voice recognition system and is likely to have sound alike errors within the text.

## 2020-05-13 NOTE — LETTER
May 13, 2020      Yoanna Mireles MD  [address]  [place] [address]           [facility]  [address] [place] [id number]  Phone: [phone]  Fax: [phone]          Patient: Rosa Maria Jones   MR Number: [id number]   YOB: 1985   Date of Visit: 5/13/2020       Dear Dr. Yoanna Mireles:    Thank you for referring Rosa Maria Jones to me for evaluation. Attached you will find relevant portions of my assessment and plan of care.    If you have questions, please do not hesitate to call me. I look forward to following Rosa Maria Jones along with you.    Sincerely,    Sebastian Wolfe MD    Enclosure  CC:  No Recipients    If you would like to receive this communication electronically, please contact [email or web address] or [phone] to request more information on [employer] Link access.    For providers and/or their staff who would like to refer a patient to [facility], please contact us through our one-stop-shop provider referral line, [facility], at [phone].    If you feel you have received this communication in error or would no longer like to receive these types of communications, please e-mail [email or web address]

## 2020-05-13 NOTE — PROGRESS NOTES
Subjective:     Patient ID: Rosa Maria Jones is a 34 y.o. female.    Chief Complaint: Pain and Numbness of the Left Wrist; Pain and Numbness of the Right Wrist; and Pain of the Right Elbow    The patient is a 34-year-old female with bilateral carpal tunnel syndrome documented by nerve conduction studies.  She wishes to have a right carpal tunnel release.  She has tried splinting without improvement.  The numbness is constant rather than intermittent.      Past Medical History:   Diagnosis Date    Abnormal Pap smear of cervix 2015    at [facility] 3 with what sounds pt had [facility] all paps since normal    ADHD (attention deficit hyperactivity disorder)     Anxiety     Asthma     Depression     Diabetes type 2, controlled     Insulin resistance     PCOS (polycystic ovarian syndrome)     Tachycardia      Past Surgical History:   Procedure Laterality Date    cold knife cone  2015    [facility] - pap f/u was normal      Family History   Problem Relation Age of Onset    Diabetes Father     Cancer Father         Salivary gland    Hypertension Father     Breast cancer Maternal Grandmother      Social History     Socioeconomic History    Marital status:      Spouse name: Not on file    Number of children: Not on file    Years of education: Not on file    Highest education level: Not on file   Occupational History    Not on file   Social Needs    Financial resource strain: Not on file    Food insecurity:     Worry: Not on file     Inability: Not on file    Transportation needs:     Medical: Not on file     Non-medical: Not on file   Tobacco Use    Smoking status: Current Every Day Smoker     Packs/day: 1.00     Types: Cigarettes    Smokeless tobacco: Never Used   Substance and Sexual Activity    Alcohol use: No    Drug use: No    Sexual activity: Yes     Partners: Male     Birth control/protection: OCP   Lifestyle    Physical activity:      Days per week: Not on file     Minutes per session: Not on file    Stress: Not on file   Relationships    Social connections:     Talks on phone: Not on file     Gets together: Not on file     Attends Je[employer]ah's witness service: Not on file     Active member of club or organization: Not on file     Attends meetings of clubs or organizations: Not on file     Relationship status: Not on file   Other Topics Concern    Not on file   Social History Narrative    Not on file     Medication List with Changes/Refills   Current Medications    ALBUTEROL (PROVENTIL/VENTOLIN HFA) 90 MCG/ACTUATION INHALER    INHALE 1 TO 2 PUFFS BY MOUTH EVERY 6 HOURS AS NEEDED    ALPRAZOLAM (XANAX) 1 MG TABLET    Take 1 tablet by mouth once daily.    DEXTROAMPHETAMINE-AMPHETAMINE (ADDERALL) 15 MG TABLET        FLUOXETINE 20 MG CAPSULE        LORAZEPAM (ATIVAN) 1 MG TABLET        NORETHINDRONE (ORTHO MICRONOR) 0.35 MG TABLET    Take 1 tablet (0.35 mg total) by mouth once daily.    NORGESTREL-ETHINYL ESTRADIOL (LO/OVRAL) 0.3-30 MG-MCG PER TABLET    Take 1 tablet by mouth.    RISPERIDONE (RISPERDAL) 2 MG TABLET         Review of patient's allergies indicates:   Allergen Reactions    Adhesive Blisters, Hives and Itching    Dairy aid [lactase]     Latex, natural rubber      Review of Systems   Constitution: Positive for malaise/fatigue.   HENT: Negative for hearing loss.    Eyes: Negative for double vision and visual disturbance.   Cardiovascular: Negative for chest pain.   Respiratory: Positive for wheezing. Negative for shortness of breath.    Endocrine: Negative for cold intolerance.   Hematologic/Lymphatic: Does not bruise/bleed easily.   Skin: Negative for poor wound healing and suspicious lesions.   Musculoskeletal: Negative for gout, joint pain and joint swelling.   Gastrointestinal: Negative for nausea and vomiting.   Genitourinary: Negative for dysuria.   Neurological: Negative for numbness, paresthesias and sensory change.    Psychiatric/Behavioral: Negative for depression, memory loss and substance abuse. The patient is not nervous/anxious.    Allergic/Immunologic: Negative for persistent infections.       Objective:   Body mass index is 37.2 kg/m².  Vitals:    05/13/20 0857   BP: (!) 150/97   Pulse: (!) 113                General    Constitutional: She is oriented to person, place, and time. She appears well-developed and well-nourished. No distress.   HENT:   Head: Normocephalic.   Mouth/Throat: Oropharynx is clear and moist.   Eyes: EOM are normal.   Neck: Normal range of motion.   Cardiovascular: Normal rate.    Pulmonary/Chest: Effort normal.   Abdominal: Soft.   Neurological: She is alert and oriented to person, place, and time. No cranial nerve deficit.   Psychiatric: She has a normal mood and affect.             Right Hand/Wrist Exam     Inspection   Scars: Wrist - absent Hand -  absent  Effusion: Wrist - absent Hand -  absent    Pain   Wrist - The patient exhibits pain of the flexor/pronator group.    Tests   Phalens sign: positive  Tinel's sign (median nerve): positive  Carpal Tunnel Compression Test: positive    Atrophy   Thenar:  negative  Intrinsic:  negative    Other     Neuorologic Exam    Median Distribution: abnormal  Ulnar Distribution: normal  Radial Distribution: normal    Comments:  The patient has a positive Tinel and positive Phalen sign.  There is no thenar atrophy noted.      Left Hand/Wrist Exam     Inspection   Scars: Wrist - absent Hand -  absent  Effusion: Wrist - absent Hand -  absent    Pain   Wrist - The patient exhibits pain of the flexor/pronator group.    Tests   Phalens sign: positive  Tinel's sign (median nerve): positive  Carpal Tunnel Compression Test: positive    Atrophy  Thenar:  Negative  Intrinsic: negative    Other     Sensory Exam  Median Distribution: abnormal  Ulnar Distribution: normal  Radial Distribution: normal    Comments:  The patient has a positive Tinel and positive Phalen sign.   There is no thenar atrophy noted.          Vascular Exam       Capillary Refill  Right Hand: normal capillary refill  Left Hand: normal capillary refill      Relevant imaging results reviewed and interpreted by me, discussed with the patient and / or family today radiographs of both wrist were normal  Assessment:     Encounter Diagnosis   Name Primary?    Bilateral carpal tunnel syndrome         Plan:     The patient wished to have a right carpal tunnel release.  She was counseled regarding the surgery.  Risk complications and alternatives were discussed including the risk of infection, anesthetic risk, injury to nerves and vessels, loss of motion, and possible need for additional surgeries were discussed.  She seems to understand agree that surgery.  All questions were answered.                Disclaimer: This note was prepared using a voice recognition system and is likely to have sound alike errors within the text.

## 2020-05-18 ENCOUNTER — PATIENT MESSAGE (OUTPATIENT)
Dept: SURGERY | Facility: HOSPITAL | Age: 35
End: 2020-05-18

## 2020-05-19 ENCOUNTER — TELEPHONE (OUTPATIENT)
Dept: ORTHOPEDICS | Facility: CLINIC | Age: 35
End: 2020-05-19

## 2020-05-19 NOTE — TELEPHONE ENCOUNTER
Attempted to call this patient to discuss when she would like to set her surgery up. And to check on her. No option to leave a message the patient voice box is full. Called [phone].  Called the patients mother number and I got ms. Crane she decided to change her surgery to June 1, 2020 I will cancel and reschedule all pre ops and post ops and the patient states she will get them through the portal

## 2020-05-29 ENCOUNTER — HOSPITAL ENCOUNTER (OUTPATIENT)
Dept: PREADMISSION TESTING | Facility: HOSPITAL | Age: 35
Discharge: HOME | End: 2020-05-29
Attending: ORTHOPAEDIC SURGERY
Payer: MEDICAID

## 2020-05-29 VITALS
SYSTOLIC BLOOD PRESSURE: 153 MMHG | HEART RATE: 93 BPM | OXYGEN SATURATION: 96 % | RESPIRATION RATE: 18 BRPM | TEMPERATURE: 98 F | DIASTOLIC BLOOD PRESSURE: 88 MMHG

## 2020-05-29 DIAGNOSIS — F31.9 BIPOLAR DEPRESSION: ICD-10-CM

## 2020-05-29 DIAGNOSIS — I10 ESSENTIAL HYPERTENSION: ICD-10-CM

## 2020-05-29 DIAGNOSIS — G56.03 BILATERAL CARPAL TUNNEL SYNDROME: ICD-10-CM

## 2020-05-29 DIAGNOSIS — Z01.818 PREOP EXAMINATION: Primary | ICD-10-CM

## 2020-05-29 LAB
ALBUMIN SERPL BCP-MCNC: 4.2 G/DL (ref 3.5–5.2)
ALP SERPL-CCNC: 69 U/L (ref 55–135)
ALT SERPL W/O P-5'-P-CCNC: 45 U/L (ref 10–44)
ANION GAP SERPL CALC-SCNC: 10 MMOL/L (ref 8–16)
AST SERPL-CCNC: 24 U/L (ref 10–40)
BASOPHILS # BLD AUTO: 0.02 K/UL (ref 0–0.2)
BASOPHILS NFR BLD: 0.2 % (ref 0–1.9)
BILIRUB SERPL-MCNC: 0.4 MG/DL (ref 0.1–1)
BUN SERPL-MCNC: 11 MG/DL (ref 6–20)
CALCIUM SERPL-MCNC: 9.2 MG/DL (ref 8.7–10.5)
CHLORIDE SERPL-SCNC: 104 MMOL/L (ref 95–110)
CO2 SERPL-SCNC: 24 MMOL/L (ref 23–29)
CREAT SERPL-MCNC: 0.8 MG/DL (ref 0.5–1.4)
DIFFERENTIAL METHOD: ABNORMAL
EOSINOPHIL # BLD AUTO: 0.3 K/UL (ref 0–0.5)
EOSINOPHIL NFR BLD: 2.7 % (ref 0–8)
ERYTHROCYTE [DISTWIDTH] IN BLOOD BY AUTOMATED COUNT: 12.6 % (ref 11.5–14.5)
EST. GFR  (AFRICAN AMERICAN): >60 ML/MIN/1.73 M^2
EST. GFR  (NON AFRICAN AMERICAN): >60 ML/MIN/1.73 M^2
ESTIMATED AVG GLUCOSE: 154 MG/DL (ref 68–131)
GLUCOSE SERPL-MCNC: 191 MG/DL (ref 70–110)
HBA1C MFR BLD HPLC: 7 % (ref 4–5.6)
HCT VFR BLD AUTO: 39.9 % (ref 37–48.5)
HGB BLD-MCNC: 14.1 G/DL (ref 12–16)
IMM GRANULOCYTES # BLD AUTO: 0.1 K/UL (ref 0–0.04)
IMM GRANULOCYTES NFR BLD AUTO: 1 % (ref 0–0.5)
LYMPHOCYTES # BLD AUTO: 2 K/UL (ref 1–4.8)
LYMPHOCYTES NFR BLD: 20.5 % (ref 18–48)
MCH RBC QN AUTO: 30.2 PG (ref 27–31)
MCHC RBC AUTO-ENTMCNC: 35.3 G/DL (ref 32–36)
MCV RBC AUTO: 85 FL (ref 82–98)
MONOCYTES # BLD AUTO: 0.8 K/UL (ref 0.3–1)
MONOCYTES NFR BLD: 8.2 % (ref 4–15)
NEUTROPHILS # BLD AUTO: 6.8 K/UL (ref 1.8–7.7)
NEUTROPHILS NFR BLD: 68.4 % (ref 38–73)
NRBC BLD-RTO: 0 /100 WBC
PLATELET # BLD AUTO: 238 K/UL (ref 150–350)
PMV BLD AUTO: 10.8 FL (ref 9.2–12.9)
POTASSIUM SERPL-SCNC: 3.8 MMOL/L (ref 3.5–5.1)
PROT SERPL-MCNC: 6.8 G/DL (ref 6–8.4)
RBC # BLD AUTO: 4.67 M/UL (ref 4–5.4)
SODIUM SERPL-SCNC: 138 MMOL/L (ref 136–145)
WBC # BLD AUTO: 9.87 K/UL (ref 3.9–12.7)

## 2020-05-29 PROCEDURE — 83036 HEMOGLOBIN GLYCOSYLATED A1C: CPT

## 2020-05-29 PROCEDURE — 80053 COMPREHEN METABOLIC PANEL: CPT

## 2020-05-29 PROCEDURE — 85025 COMPLETE CBC W/AUTO DIFF WBC: CPT

## 2020-05-29 RX ORDER — FLUOXETINE HYDROCHLORIDE 40 MG/1
80 CAPSULE ORAL NIGHTLY
COMMUNITY

## 2020-05-29 NOTE — ASSESSMENT & PLAN NOTE
- Patient presents today at the request of Dr. Wolfe who plans on performing a Right Carpal Tunnel release on 6/1  - Patient was instructed to hold all NSAIDs prior to surgery    Known risk factors for perioperative complications: Daily smoker, 1 PPD x 15 years  Difficulty with intubation is not anticipated.    Cardiac Risk Estimation: Based on the Revised Cardiac Risk Index patient is a Class 1 risk with a 3.9% risk of a major cardiac event in a low risk procedure.     1.) Preoperative workup as follows: hemoglobin, hematocrit, electrolytes, creatinine, glucose, liver function studies.  2.) Change in medication regimen before surgery: discontinue ASA 6 days before surgery, discontinue NSAIDs 5 days before surgery.   3.) Prophylaxis for cardiac events with perioperative beta-blockers: not indicated.  4.) Invasive hemodynamic monitoring perioperatively: not indicated.  5.) Deep vein thrombosis prophylaxis postoperatively: intermittent pneumatic compression boots.  6.) Surveillance for postoperative MI with ECG immediately postoperatively and on postoperati ve days 1 and 2 AND troponin levels 24 hours postoperatively and on day 4 or hospital discharge (whichever comes first): not indicated.  7.) Current medications which may produce withdrawal symptoms if withheld perioperatively: None  8.) Other measures: None

## 2020-05-29 NOTE — ASSESSMENT & PLAN NOTE
- A1c in 4/2018 6.1  - Repeat A1c pending  - Patient is not currently taking any medications and is controlling with diet and exercise  - Will need outpatient f/u with PCP

## 2020-05-29 NOTE — H&P
Preoperative History and Physical                                                             Hospital Medicine      Chief Complaint: Preoperative evaluation     History of Present Illness:      Rosa Maria Jones is a 35 y.o. female with a PMHx of Obesity, Bipolar Depression, Anxiety, DM, ADHD, PCOS, and Bilateral Carpal tunnel syndrome who presents to the office today for a preoperative consultation at the request of Dr. Wolfe who plans on performing a Right Carpal Tunnel release on June 1.     Functional Status:      The patient is able to climb a flight of stairs. The patient is able to ambulate without difficulty. The patient's functional status is affected by the surgical problem. The patient's functional status is not affected by shortness of breath, chest pain, dyspnea on exertion and fatigue.    MET score greater than 4    Past Medical History:      Past Medical History:   Diagnosis Date    [facility] pt had [facility] all paps since normal    ADHD (attention deficit hyperactivity disorder)     Anxiety     Asthma     Depression     Diabetes type 2, controlled     Insulin resistance     PCOS (polycystic ovarian syndrome)     Tachycardia         Past Surgical History:      Past Surgical History:   Procedure Laterality Date    cold knife cone  2015    [facility] - pap f/u was normal         Social History:      Social History     Socioeconomic History    Marital status:      Spouse name: Not on file    Number of children: Not on file    Years of education: Not on file    Highest education level: Not on file   Occupational History    Not on file   Social Needs    Financial resource strain: Not on file    Food insecurity:     Worry: Not on file     Inability: Not on file    Transportation needs:     Medical: Not on file      Non-medical: Not on file   Tobacco Use    Smoking status: Current Every Day Smoker     Packs/day: 1.00     Types: Cigarettes    Smokeless tobacco: Never Used   Substance and Sexual Activity    Alcohol use: No    Drug use: No    Sexual activity: Yes     Partners: Male     Birth control/protection: OCP   Lifestyle    Physical activity:     Days per week: Not on file     Minutes per session: Not on file    Stress: Not on file   Relationships    Social connections:     Talks on phone: Not on file     Gets together: Not on file     Attends [employer] service: Not on file     Active member of club or organization: Not on file     Attends meetings of clubs or organizations: Not on file     Relationship status: Not on file   Other Topics Concern    Not on file   Social History Narrative    Not on file        Family History:      Family History   Problem Relation Age of Onset    Diabetes Father     Cancer Father         Salivary gland    Hypertension Father     Breast cancer Maternal Grandmother        Allergies:      Review of patient's allergies indicates:   Allergen Reactions    Adhesive Blisters, Hives and Itching    Dairy aid [lactase]     Latex, natural rubber        Medications:      Current Outpatient Medications   Medication Sig    albuterol (PROVENTIL/VENTOLIN HFA) 90 mcg/actuation inhaler INHALE 1 TO 2 PUFFS BY MOUTH EVERY 6 HOURS AS NEEDED    alprazolam (XANAX) 1 MG tablet Take 1 tablet by mouth once daily.    dextroamphetamine-amphetamine (ADDERALL) 15 mg tablet Take 15 mg by mouth 4 (four) times daily.     FLUoxetine 40 MG capsule Take 60 mg by mouth every evening.     norethindrone (ORTHO MICRONOR) 0.35 mg tablet Take 1 tablet (0.35 mg total) by mouth once daily.    risperiDONE (RISPERDAL) 2 MG tablet Take 1 mg by mouth every evening.     FLUoxetine 20 MG capsule     LORazepam (ATIVAN) 1 MG tablet     norgestrel-ethinyl estradioL (LO/OVRAL) 0.3-30 mg-mcg per tablet Take 1 tablet by  mouth.     No current facility-administered medications for this encounter.        Vitals:      Vitals:    05/29/20 1023   BP: (!) 153/88   Pulse: 93   Resp: 18   Temp: 98.1 °F (36.7 °C)       Review of Systems:        Constitutional: Negative for fever, chills, weight loss, malaise/fatigue and diaphoresis.   HENT: Negative for hearing loss, ear pain, nosebleeds, congestion, sore throat, neck pain, tinnitus and ear discharge.    Eyes: Negative for blurred vision, double vision, photophobia, pain, discharge and redness.   Respiratory: Negative for cough, hemoptysis, sputum production, shortness of breath, wheezing and stridor.    Cardiovascular: Negative for chest pain, palpitations, orthopnea, claudication, leg swelling and PND.   Gastrointestinal: Negative for heartburn, nausea, vomiting, abdominal pain, diarrhea, constipation, blood in stool and melena.   Genitourinary: Negative for dysuria, urgency, frequency, hematuria and flank pain.   Musculoskeletal: Negative for myalgias, back pain, and falls. Positive for right hand/wrist pain, numbness/tingling, and decreased hand /strength.  Skin: Negative for itching and rash.   Neurological: Negative for dizziness, tingling, tremors, sensory change, speech change, focal weakness, seizures, loss of consciousness, weakness and headaches.   Endo/Heme/Allergies: Negative for environmental allergies and polydipsia. Does not bruise/bleed easily.   Psychiatric/Behavioral: Negative for depression, suicidal ideas, hallucinations, memory loss and substance abuse. The patient is not nervous/anxious and does not have insomnia.    All 14 systems reviewed and negative except as noted above.    Physical Exam:      Constitutional: Appears well-developed, well-nourished and in no acute distress.  Patient is oriented to person, place, and time.   Head: Normocephalic and atraumatic. Mucous membranes moist.  Neck: Neck supple no mass.   Cardiovascular: Normal rate and regular rhythm.   S1 S2 appreciated by ascultation.  Pulmonary/Chest: Effort normal and clear to auscultation bilaterally. No respiratory distress.   Abdomen: Soft. Non-tender and non-distended. Bowel sounds are normal.   Neurological: Patient is alert and oriented to person, place and time. Moves all extremities.  Skin: Warm and dry. No lesions.  Extremities: No clubbing, cyanosis or edema.    Laboratory data:      Reviewed and noted in plan where applicable. Please see chart for full laboratory data.    No results for input(s): CPK, CPKMB, TROPONINI, MB in the last 24 hours. No results for input(s): POCTGLUCOSE in the last 24 hours.     No results found for: INR, PROTIME    Lab Results   Component Value Date    WBC 8.82 01/23/2018    HGB 14.0 01/23/2018    HCT 38.5 01/23/2018    MCV 85 01/23/2018     01/23/2018       No results for input(s): GLU, NA, K, CL, CO2, BUN, CREATININE, CALCIUM, MG in the last 24 hours.    Predictors of intubation difficulty:       Morbid obesity? yes    Anatomically abnormal facies? no   Prominent incisors? no   Receding mandible? no   Short, thick neck? Yes   Neck range of motion: normal   Dentition: No chipped, loose, or missing teeth.    Cardiographics:      ECG: Not indicated.  Echocardiogram: Not indicated.    Imaging:      Chest x-ray: Not indicated.    Assessment and Plan:      Bilateral carpal tunnel syndrome  - Patient presents today at the request of Dr. Wolfe who plans on performing a Right Carpal Tunnel release on 6/1  - Patient was instructed to hold all NSAIDs prior to surgery    Known risk factors for perioperative complications: Daily smoker, 1 PPD x 15 years  Difficulty with intubation is not anticipated.    Cardiac Risk Estimation: Based on the Revised Cardiac Risk Index patient is a Class 1 risk with a 3.9% risk of a major cardiac event in a low risk procedure.     1.) Preoperative workup as follows: hemoglobin, hematocrit, electrolytes, creatinine, glucose, liver function  studies.  2.) Change in medication regimen before surgery: discontinue ASA 6 days before surgery, discontinue NSAIDs 5 days before surgery.   3.) Prophylaxis for cardiac events with perioperative beta-blockers: not indicated.  4.) Invasive hemodynamic monitoring perioperatively: not indicated.  5.) Deep vein thrombosis prophylaxis postoperatively: intermittent pneumatic compression boots.  6.) Surveillance for postoperative MI with ECG immediately postoperatively and on postoperati ve days 1 and 2 AND troponin levels 24 hours postoperatively and on day 4 or hospital discharge (whichever comes first): not indicated.  7.) Current medications which may produce withdrawal symptoms if withheld perioperatively: None  8.) Other measures: None    Essential hypertension  - BP mildly elevated   - Not currently on any BP meds at home  - Outpatient f/u with PCP    Type II or unspecified type diabetes mellitus without mention of complication, not stated as uncontrolled  - A1c in 4/2018 6.1  - Repeat A1c pending  - Patient is not currently taking any medications and is controlling with diet and exercise  - Will need outpatient f/u with PCP    Bipolar depression  - Continue home meds

## 2020-05-29 NOTE — DISCHARGE INSTRUCTIONS
To confirm, Your doctor has instructed you that surgery is scheduled for 6/1/20.      Mandatory COVID19 on Saturday 5/30 at [facility] at 1220.      Please report to [facility] lobby by 0615.  Pre admit office will call afternoon prior to surgery with final arrival time    INSTRUCTIONS IMPORTANT!!!   Do not eat, drink, or smoke after 12 midnight-including water. OK to brush teeth, no gum, candy or mints!    ¨ Take only these medicines with a small swallow of water-morning of surgery.  Albuterol inhaler, birth control pill, Xanax, Prozac    Pre operative instructions:  Please review the Pre-Operative Instruction booklet that you were given.        Bathing Instructions--See page 6 in the Pre-operative booklet.      Prevention of surgical site infections:     -Keep incisions clean and dry.   -Do not soak/submerge incisions in water until completely healed.   -Do not apply lotions, powders, creams, or deodorants to site.   -Always make sure hands are cleaned with antibacterial soap/ alcohol-based  prior to touching the surgical site.  (This includes doctors, nurses, staff, and yourself.)    Signs and symptoms:   -Redness and pain around the area where you had surgery   -Drainage of cloudy fluid from your surgical wound   -Fever over 100.4       I have read or had read and explained to me, and understand the above information.  Additional comments or instructions:  Received a copy of Pre-operative instructions booklet, FAQ surgical site infection sheet, and packets of hibiclens (if indicated).

## 2020-05-30 ENCOUNTER — LAB VISIT (OUTPATIENT)
Dept: OTOLARYNGOLOGY | Facility: CLINIC | Age: 35
End: 2020-05-30
Payer: MEDICAID

## 2020-05-30 DIAGNOSIS — Z01.818 PREOP TESTING: ICD-10-CM

## 2020-05-30 PROCEDURE — U0003 INFECTIOUS AGENT DETECTION BY NUCLEIC ACID (DNA OR RNA); SEVERE ACUTE RESPIRATORY SYNDROME CORONAVIRUS 2 (SARS-COV-2) (CORONAVIRUS DISEASE [COVID-19]), AMPLIFIED PROBE TECHNIQUE, MAKING USE OF HIGH THROUGHPUT TECHNOLOGIES AS DESCRIBED BY CMS-2020-01-R: HCPCS

## 2020-05-31 LAB — SARS-COV-2 RNA RESP QL NAA+PROBE: NOT DETECTED

## 2020-06-01 ENCOUNTER — ANESTHESIA EVENT (OUTPATIENT)
Dept: SURGERY | Facility: HOSPITAL | Age: 35
End: 2020-06-01
Payer: MEDICAID

## 2020-06-01 ENCOUNTER — HOSPITAL ENCOUNTER (OUTPATIENT)
Facility: HOSPITAL | Age: 35
Discharge: HOME | End: 2020-06-01
Attending: ORTHOPAEDIC SURGERY | Admitting: ORTHOPAEDIC SURGERY
Payer: MEDICAID

## 2020-06-01 ENCOUNTER — ANESTHESIA (OUTPATIENT)
Dept: SURGERY | Facility: HOSPITAL | Age: 35
End: 2020-06-01
Payer: MEDICAID

## 2020-06-01 ENCOUNTER — NURSE TRIAGE (OUTPATIENT)
Dept: ADMINISTRATIVE | Facility: CLINIC | Age: 35
End: 2020-06-01

## 2020-06-01 DIAGNOSIS — Z01.818 PREOP EXAMINATION: ICD-10-CM

## 2020-06-01 DIAGNOSIS — G56.01 RIGHT CARPAL TUNNEL SYNDROME: Primary | ICD-10-CM

## 2020-06-01 PROBLEM — I10 ESSENTIAL HYPERTENSION: Chronic | Status: ACTIVE | Noted: 2017-09-08

## 2020-06-01 LAB
B-HCG UR QL: NEGATIVE
CTP QC/QA: YES
POCT GLUCOSE: 157 MG/DL (ref 70–110)
POCT GLUCOSE: 194 MG/DL (ref 70–110)

## 2020-06-01 PROCEDURE — D9220A PRA ANESTHESIA: ICD-10-PCS | Mod: ANES,,, | Performed by: ANESTHESIOLOGY

## 2020-06-01 PROCEDURE — 36000706: Performed by: ORTHOPAEDIC SURGERY

## 2020-06-01 PROCEDURE — 63600175 PHARM REV CODE 636 W HCPCS: Performed by: NURSE ANESTHETIST, CERTIFIED REGISTERED

## 2020-06-01 PROCEDURE — 37000008 HC ANESTHESIA 1ST 15 MINUTES: Performed by: ORTHOPAEDIC SURGERY

## 2020-06-01 PROCEDURE — 25000003 PHARM REV CODE 250: Performed by: NURSE ANESTHETIST, CERTIFIED REGISTERED

## 2020-06-01 PROCEDURE — 37000009 HC ANESTHESIA EA ADD 15 MINS: Performed by: ORTHOPAEDIC SURGERY

## 2020-06-01 PROCEDURE — D9220A PRA ANESTHESIA: Mod: ANES,,, | Performed by: ANESTHESIOLOGY

## 2020-06-01 PROCEDURE — D9220A PRA ANESTHESIA: ICD-10-PCS | Mod: CRNA,,, | Performed by: NURSE ANESTHETIST, CERTIFIED REGISTERED

## 2020-06-01 PROCEDURE — 64721 CARPAL TUNNEL SURGERY: CPT | Mod: RT,,, | Performed by: ORTHOPAEDIC SURGERY

## 2020-06-01 PROCEDURE — 01810 ANES PX NRV MUSC F/ARM WRST: CPT | Performed by: ORTHOPAEDIC SURGERY

## 2020-06-01 PROCEDURE — 25000003 PHARM REV CODE 250: Performed by: ORTHOPAEDIC SURGERY

## 2020-06-01 PROCEDURE — 82962 GLUCOSE BLOOD TEST: CPT | Performed by: ORTHOPAEDIC SURGERY

## 2020-06-01 PROCEDURE — 64721 PR REVISE MEDIAN N/CARPAL TUNNEL SURG: ICD-10-PCS | Mod: RT,,, | Performed by: ORTHOPAEDIC SURGERY

## 2020-06-01 PROCEDURE — 63600175 PHARM REV CODE 636 W HCPCS: Performed by: ANESTHESIOLOGY

## 2020-06-01 PROCEDURE — D9220A PRA ANESTHESIA: Mod: CRNA,,, | Performed by: NURSE ANESTHETIST, CERTIFIED REGISTERED

## 2020-06-01 PROCEDURE — 71000015 HC POSTOP RECOV 1ST HR: Performed by: ORTHOPAEDIC SURGERY

## 2020-06-01 PROCEDURE — 71000033 HC RECOVERY, INTIAL HOUR: Performed by: ORTHOPAEDIC SURGERY

## 2020-06-01 PROCEDURE — 63600175 PHARM REV CODE 636 W HCPCS: Performed by: ORTHOPAEDIC SURGERY

## 2020-06-01 PROCEDURE — 81025 URINE PREGNANCY TEST: CPT | Performed by: NURSE PRACTITIONER

## 2020-06-01 PROCEDURE — 25000003 PHARM REV CODE 250: Performed by: ANESTHESIOLOGY

## 2020-06-01 PROCEDURE — 36000707: Performed by: ORTHOPAEDIC SURGERY

## 2020-06-01 RX ORDER — FENTANYL CITRATE 50 UG/ML
INJECTION, SOLUTION INTRAMUSCULAR; INTRAVENOUS
Status: DISCONTINUED | OUTPATIENT
Start: 2020-06-01 | End: 2020-06-01

## 2020-06-01 RX ORDER — ACETAMINOPHEN 500 MG
1000 TABLET ORAL ONCE
Status: COMPLETED | OUTPATIENT
Start: 2020-06-01 | End: 2020-06-01

## 2020-06-01 RX ORDER — TRIAMCINOLONE ACETONIDE 40 MG/ML
INJECTION, SUSPENSION INTRA-ARTICULAR; INTRAMUSCULAR
Status: DISCONTINUED
Start: 2020-06-01 | End: 2020-06-01 | Stop reason: HOSPADM

## 2020-06-01 RX ORDER — LIDOCAINE HYDROCHLORIDE 20 MG/ML
INJECTION, SOLUTION EPIDURAL; INFILTRATION; INTRACAUDAL; PERINEURAL
Status: DISCONTINUED | OUTPATIENT
Start: 2020-06-01 | End: 2020-06-01 | Stop reason: HOSPADM

## 2020-06-01 RX ORDER — ALPRAZOLAM 0.5 MG/1
0.5 TABLET ORAL ONCE AS NEEDED
Status: COMPLETED | OUTPATIENT
Start: 2020-06-01 | End: 2020-06-01

## 2020-06-01 RX ORDER — LIDOCAINE HCL/PF 100 MG/5ML
SYRINGE (ML) INTRAVENOUS
Status: DISCONTINUED | OUTPATIENT
Start: 2020-06-01 | End: 2020-06-01

## 2020-06-01 RX ORDER — TRIAMCINOLONE ACETONIDE 40 MG/ML
INJECTION, SUSPENSION INTRA-ARTICULAR; INTRAMUSCULAR
Status: DISCONTINUED | OUTPATIENT
Start: 2020-06-01 | End: 2020-06-01 | Stop reason: HOSPADM

## 2020-06-01 RX ORDER — CHLORHEXIDINE GLUCONATE ORAL RINSE 1.2 MG/ML
10 SOLUTION DENTAL
Status: CANCELLED | OUTPATIENT
Start: 2020-06-01

## 2020-06-01 RX ORDER — PROPOFOL 10 MG/ML
VIAL (ML) INTRAVENOUS
Status: DISCONTINUED | OUTPATIENT
Start: 2020-06-01 | End: 2020-06-01

## 2020-06-01 RX ORDER — CHLORHEXIDINE GLUCONATE ORAL RINSE 1.2 MG/ML
10 SOLUTION DENTAL 2 TIMES DAILY
Status: DISCONTINUED | OUTPATIENT
Start: 2020-06-01 | End: 2020-06-01 | Stop reason: HOSPADM

## 2020-06-01 RX ORDER — MIDAZOLAM HYDROCHLORIDE 1 MG/ML
INJECTION, SOLUTION INTRAMUSCULAR; INTRAVENOUS
Status: DISCONTINUED | OUTPATIENT
Start: 2020-06-01 | End: 2020-06-01

## 2020-06-01 RX ORDER — ONDANSETRON 2 MG/ML
INJECTION INTRAMUSCULAR; INTRAVENOUS
Status: DISCONTINUED | OUTPATIENT
Start: 2020-06-01 | End: 2020-06-01

## 2020-06-01 RX ORDER — SODIUM CHLORIDE, SODIUM LACTATE, POTASSIUM CHLORIDE, CALCIUM CHLORIDE 600; 310; 30; 20 MG/100ML; MG/100ML; MG/100ML; MG/100ML
INJECTION, SOLUTION INTRAVENOUS CONTINUOUS
Status: DISCONTINUED | OUTPATIENT
Start: 2020-06-01 | End: 2020-06-01 | Stop reason: HOSPADM

## 2020-06-01 RX ORDER — NAPROXEN 250 MG/1
500 TABLET ORAL ONCE
Status: COMPLETED | OUTPATIENT
Start: 2020-06-01 | End: 2020-06-01

## 2020-06-01 RX ORDER — CEFAZOLIN SODIUM 2 G/50ML
2 SOLUTION INTRAVENOUS
Status: CANCELLED | OUTPATIENT
Start: 2020-06-01

## 2020-06-01 RX ORDER — LIDOCAINE HYDROCHLORIDE 10 MG/ML
0.5 INJECTION, SOLUTION EPIDURAL; INFILTRATION; INTRACAUDAL; PERINEURAL ONCE
Status: DISCONTINUED | OUTPATIENT
Start: 2020-06-01 | End: 2020-06-01 | Stop reason: HOSPADM

## 2020-06-01 RX ORDER — LIDOCAINE HYDROCHLORIDE 20 MG/ML
INJECTION, SOLUTION INFILTRATION; PERINEURAL
Status: DISCONTINUED
Start: 2020-06-01 | End: 2020-06-01 | Stop reason: HOSPADM

## 2020-06-01 RX ORDER — HYDROCODONE BITARTRATE AND ACETAMINOPHEN 5; 325 MG/1; MG/1
1 TABLET ORAL EVERY 6 HOURS PRN
Qty: 15 TABLET | Refills: 0 | Status: SHIPPED | OUTPATIENT
Start: 2020-06-01 | End: 2020-06-02 | Stop reason: SDUPTHER

## 2020-06-01 RX ADMIN — PROPOFOL 20 MG: 10 INJECTION, EMULSION INTRAVENOUS at 12:06

## 2020-06-01 RX ADMIN — PROPOFOL 30 MG: 10 INJECTION, EMULSION INTRAVENOUS at 12:06

## 2020-06-01 RX ADMIN — DEXTROSE 2 G: 50 INJECTION, SOLUTION INTRAVENOUS at 11:06

## 2020-06-01 RX ADMIN — ACETAMINOPHEN 1000 MG: 500 TABLET ORAL at 08:06

## 2020-06-01 RX ADMIN — PROPOFOL 20 MG: 10 INJECTION, EMULSION INTRAVENOUS at 11:06

## 2020-06-01 RX ADMIN — Medication 60 MG: at 11:06

## 2020-06-01 RX ADMIN — FENTANYL CITRATE 50 MCG: 50 INJECTION, SOLUTION INTRAMUSCULAR; INTRAVENOUS at 11:06

## 2020-06-01 RX ADMIN — ALPRAZOLAM 0.5 MG: 0.5 TABLET ORAL at 08:06

## 2020-06-01 RX ADMIN — PROPOFOL 80 MG: 10 INJECTION, EMULSION INTRAVENOUS at 11:06

## 2020-06-01 RX ADMIN — PROPOFOL 40 MG: 10 INJECTION, EMULSION INTRAVENOUS at 11:06

## 2020-06-01 RX ADMIN — SODIUM CHLORIDE, SODIUM LACTATE, POTASSIUM CHLORIDE, AND CALCIUM CHLORIDE: .6; .31; .03; .02 INJECTION, SOLUTION INTRAVENOUS at 08:06

## 2020-06-01 RX ADMIN — NAPROXEN 500 MG: 250 TABLET ORAL at 08:06

## 2020-06-01 RX ADMIN — ONDANSETRON 4 MG: 2 INJECTION, SOLUTION INTRAMUSCULAR; INTRAVENOUS at 11:06

## 2020-06-01 RX ADMIN — MIDAZOLAM 2 MG: 1 INJECTION INTRAMUSCULAR; INTRAVENOUS at 11:06

## 2020-06-01 NOTE — ANESTHESIA POSTPROCEDURE EVALUATION
Anesthesia Post Evaluation    Patient: Rosa Maria Jones    Procedure(s) Performed: Procedure(s) (LRB):  RELEASE, CARPAL TUNNEL (Right)  Injection, Joint (Right)    Final Anesthesia Type: MAC    Patient location during evaluation: PACU  Patient participation: Yes- Able to Participate  Level of consciousness: awake and alert and oriented  Post-procedure vital signs: reviewed and stable  Pain management: adequate  Airway patency: patent    PONV status at discharge: No PONV  Anesthetic complications: no      Cardiovascular status: hemodynamically stable  Respiratory status: unassisted, spontaneous ventilation and room air  Hydration status: euvolemic  Follow-up not needed.          Vitals Value Taken Time   /75 6/1/2020 12:44 PM   Temp 36.4 °C (97.5 °F) 6/1/2020 12:22 PM   Pulse 85 6/1/2020 12:44 PM   Resp 17 6/1/2020 12:44 PM   SpO2 97 % 6/1/2020 12:44 PM   Vitals shown include unvalidated device data.      No case tracking events are documented in the log.      Pain/Berenice Score: Pain Rating Prior to Med Admin: 2 (6/1/2020  8:34 AM)  Berenice Score: 9 (6/1/2020 12:40 PM)

## 2020-06-01 NOTE — ANESTHESIA PREPROCEDURE EVALUATION
06/01/2020  Rosa Maria Jones is a 35 y.o., female.    Anesthesia Evaluation    I have reviewed the Patient Summary Reports.    I have reviewed the Nursing Notes. I have reviewed the NPO Status.   I have reviewed the Medications.     Review of Systems  Anesthesia Hx:  History of prior surgery of interest to airway management or planning:  Denies Personal Hx of Anesthesia complications.   Social:  Smoker    Endocrine:   Diabetes    Psych:   anxiety depression  Attention Deficit Disorder.         Physical Exam  General:  Obesity    Airway/Jaw/Neck:  Airway Findings: General Airway Assessment: Adult           Mental Status:  Mental Status Findings:  Cooperative, Alert and Oriented         Anesthesia Plan  Type of Anesthesia, risks & benefits discussed:  Anesthesia Type:  MAC  Patient's Preference:   Intra-op Monitoring Plan: standard ASA monitors  Intra-op Monitoring Plan Comments:   Post Op Pain Control Plan:   Post Op Pain Control Plan Comments:   Induction:   IV  Beta Blocker:  Patient is not currently on a Beta-Blocker (No further documentation required).       Informed Consent: Patient understands risks and agrees with Anesthesia plan.  Questions answered. Anesthesia consent signed with patient.  ASA Score: 2     Day of Surgery Review of History & Physical:    H&P update referred to the surgeon.         Ready For Surgery From Anesthesia Perspective.

## 2020-06-01 NOTE — OP NOTE
[facility]  Orthopedic Surgery  Operative Note    SUMMARY     Date of Procedure: 6/1/2020   Assistant: None    Procedure: Procedure(s) (LRB):  RELEASE, CARPAL TUNNEL (Right)  Injection, Joint (Right)   right elbow    Surgeon(s) and Role:     * Sebastian Wolfe MD - Primary    Assisting Surgeon: None    Pre-Operative Diagnosis: Carpal tunnel syndrome of right wrist [G56.01]  Lateral epicondylitis of right elbow [M77.11]    Post-Operative Diagnosis: Post-Op Diagnosis Codes:     * Carpal tunnel syndrome of right wrist [G56.01]     * Lateral epicondylitis of right elbow [M77.11]    Anesthesia:  Local with sedation    Technical Procedures Used:  right carpal tunnel release    Description of the Findings of the Procedure:  The patient taken the operating room where 10 cc of 2% plain lidocaine was used for a local anesthetic about the volar aspect of the right wrist. Satisfactory anesthesia had been achieved the right hand was prepped and draped usual sterile fashion.  After exsanguination of the extremity a proximal tourniquet was inflated to 250 mm of mercury after the patient received 2 g of Ancef intravenously preoperatively.  At this time a longitudinal incision was made in line with 4th ray over the transverse carpal ligament.  The incision extended using blunt sharp dissection using 3.5 loupe magnification.  The transverse carpal ligament was  identified and sharply incised under direct vision.  Complete release was performed and verified by the surgeon's small finger both proximally distally. No additional pathology was encountered satisfactory release had been achieved skin was closed using interrupted 4-0 nylon suture.  Xeroform gauze 4x4s and 2 ABD pads over wrapped with 3 in gauze dressing.  The patient tolerated the procedure well was transferred recovery room in satisfactory condition.      Complications: No    Estimated Blood Loss (EBL): 5cc                        Condition:  Good    Disposition: PACU - hemodynamically stable.    Attestation: I was present and scrubbed for the entire procedure.

## 2020-06-01 NOTE — INTERVAL H&P NOTE
The patient has been examined and the H&P has been reviewed:    I concur with the findings and no changes have occurred since H&P was written.    Anesthesia/Surgery risks, benefits and alternative options discussed and understood by patient/family.          Active Hospital Problems    Diagnosis  POA    Right carpal tunnel syndrome [G56.01]  Yes    Bilateral carpal tunnel syndrome [G56.03]  Yes      Resolved Hospital Problems   No resolved problems to display.

## 2020-06-01 NOTE — TRANSFER OF CARE
"Anesthesia Transfer of Care Note    Patient: Rosa Maria Jones    Procedure(s) Performed: Procedure(s) (LRB):  RELEASE, CARPAL TUNNEL (Right)  Injection, Joint (Right)    Patient location: PACU    Anesthesia Type: MAC    Transport from OR: Transported from OR on room air with adequate spontaneous ventilation    Post pain: adequate analgesia    Post assessment: no apparent anesthetic complications    Post vital signs: stable    Level of consciousness: awake    Nausea/Vomiting: no nausea/vomiting    Complications: none    Transfer of care protocol was followed      Last vitals:   Visit Vitals  BP (!) 167/102 (BP Location: Right arm, Patient Position: Sitting)   Pulse 97   Temp 36.7 °C (98.1 °F) (Temporal)   Resp 18   Ht 5' 3" (1.6 m)   Wt 91.5 kg (201 lb 11.5 oz)   SpO2 96%   Breastfeeding? No   BMI 35.73 kg/m²     "

## 2020-06-01 NOTE — DISCHARGE SUMMARY
[facility]  Discharge Note  Short Stay    OUTCOME: Patient tolerated treatment/procedure well without complication and is now ready for discharge.    DISPOSITION: Home or Self Care    FINAL DIAGNOSIS:  Right carpal tunnel syndrome    FOLLOWUP: In orthopedic clinic

## 2020-06-02 ENCOUNTER — OFFICE VISIT (OUTPATIENT)
Dept: ORTHOPEDICS | Facility: CLINIC | Age: 35
End: 2020-06-02
Payer: MEDICAID

## 2020-06-02 VITALS
DIASTOLIC BLOOD PRESSURE: 91 MMHG | WEIGHT: 201 LBS | BODY MASS INDEX: 35.61 KG/M2 | HEIGHT: 63 IN | HEART RATE: 88 BPM | SYSTOLIC BLOOD PRESSURE: 142 MMHG

## 2020-06-02 DIAGNOSIS — G56.01 CARPAL TUNNEL SYNDROME OF RIGHT WRIST: Primary | ICD-10-CM

## 2020-06-02 PROCEDURE — 99999 PR PBB SHADOW E&M-EST. PATIENT-LVL III: ICD-10-PCS | Mod: PBBFAC,,, | Performed by: ORTHOPAEDIC SURGERY

## 2020-06-02 PROCEDURE — 99213 OFFICE O/P EST LOW 20 MIN: CPT | Mod: PBBFAC | Performed by: ORTHOPAEDIC SURGERY

## 2020-06-02 PROCEDURE — 99024 POSTOP FOLLOW-UP VISIT: CPT | Mod: ,,, | Performed by: ORTHOPAEDIC SURGERY

## 2020-06-02 PROCEDURE — 99999 PR PBB SHADOW E&M-EST. PATIENT-LVL III: CPT | Mod: PBBFAC,,, | Performed by: ORTHOPAEDIC SURGERY

## 2020-06-02 PROCEDURE — 99024 PR POST-OP FOLLOW-UP VISIT: ICD-10-PCS | Mod: ,,, | Performed by: ORTHOPAEDIC SURGERY

## 2020-06-02 RX ORDER — HYDROCODONE BITARTRATE AND ACETAMINOPHEN 5; 325 MG/1; MG/1
1 TABLET ORAL EVERY 6 HOURS PRN
Qty: 28 TABLET | Refills: 0 | Status: SHIPPED | OUTPATIENT
Start: 2020-06-02 | End: 2020-07-08

## 2020-06-02 NOTE — PROGRESS NOTES
Subjective:     Patient ID: Rosa Maria Jones is a 35 y.o. female.    Chief Complaint: Pain and Post-op Evaluation of the Right Hand    The patient is a 35-year-old female seen in follow-up after a right carpal tunnel release and injection right elbow lateral epicondyle date of surgery is 06/01/2020.  The on-call nurse called me last night in said the patient called and said she had excruciating pain.  I told her to double up on the pain pills to #2 5 mg Norco tablets Q 6 hr as needed.  The patient said that seems to take care of her pain problem.      Past Medical History:   Diagnosis Date    [facility]ix [facility]    [facility] pt had [facility] all paps since normal    ADHD (attention deficit hyperactivity disorder)     Anxiety     Asthma     Depression     Diabetes type 2, controlled     Essential hypertension 9/8/2017    Insulin resistance     Obesity     PCOS (polycystic ovarian syndrome)     Tachycardia      Past Surgical History:   Procedure Laterality Date    cold knife cone  2015    [facility] - pap f/u was normal      Family History   Problem Relation Age of Onset    Diabetes Father     Cancer Father         Salivary gland    Hypertension Father     Breast cancer Maternal Grandmother      Social History     Socioeconomic History    Marital status:      Spouse name: Not on file    Number of children: Not on file    Years of education: Not on file    Highest education level: Not on file   Occupational History    Not on file   Social Needs    Financial resource strain: Not on file    Food insecurity:     Worry: Not on file     Inability: Not on file    Transportation needs:     Medical: Not on file     Non-medical: Not on file   Tobacco Use    Smoking status: Current Every Day Smoker     Packs/day: 1.00     Types: Cigarettes    Smokeless tobacco: Never Used   Substance and Sexual Activity     Alcohol use: No    Drug use: No    Sexual activity: Yes     Partners: Male     Birth control/protection: OCP   Lifestyle    Physical activity:     Days per week: Not on file     Minutes per session: Not on file    Stress: Not on file   Relationships    Social connections:     Talks on phone: Not on file     Gets together: Not on file     Attends Confucianism service: Not on file     Active member of club or organization: Not on file     Attends meetings of clubs or organizations: Not on file     Relationship status: Not on file   Other Topics Concern    Not on file   Social History Narrative    Not on file     Medication List with Changes/Refills   Current Medications    ALBUTEROL (PROVENTIL/VENTOLIN HFA) 90 MCG/ACTUATION INHALER    INHALE 1 TO 2 PUFFS BY MOUTH EVERY 6 HOURS AS NEEDED    ALPRAZOLAM (XANAX) 1 MG TABLET    Take 1 tablet by mouth once daily.    DEXTROAMPHETAMINE-AMPHETAMINE (ADDERALL) 15 MG TABLET    Take 15 mg by mouth 4 (four) times daily.     FLUOXETINE 20 MG CAPSULE        FLUOXETINE 40 MG CAPSULE    Take 60 mg by mouth every evening.     HYDROCODONE-ACETAMINOPHEN (NORCO) 5-325 MG PER TABLET    Take 1 tablet by mouth every 6 (six) hours as needed for Pain.    LORAZEPAM (ATIVAN) 1 MG TABLET        NORETHINDRONE (ORTHO MICRONOR) 0.35 MG TABLET    Take 1 tablet (0.35 mg total) by mouth once daily.    NORGESTREL-ETHINYL ESTRADIOL (LO/OVRAL) 0.3-30 MG-MCG PER TABLET    Take 1 tablet by mouth.    RISPERIDONE (RISPERDAL) 2 MG TABLET    Take 1 mg by mouth every evening.      Review of patient's allergies indicates:   Allergen Reactions    Adhesive Blisters, Hives and Itching    Dairy aid [lactase]     Latex, natural rubber      Review of Systems   Constitution: Positive for malaise/fatigue.   HENT: Negative for hearing loss.    Eyes: Negative for double vision and visual disturbance.   Cardiovascular: Negative for chest pain.   Respiratory: Positive for wheezing. Negative for shortness of breath.     Endocrine: Negative for cold intolerance.   Hematologic/Lymphatic: Does not bruise/bleed easily.   Skin: Negative for poor wound healing and suspicious lesions.   Musculoskeletal: Negative for gout, joint pain and joint swelling.   Gastrointestinal: Negative for nausea and vomiting.   Genitourinary: Negative for dysuria.   Neurological: Positive for numbness, paresthesias and sensory change.   Psychiatric/Behavioral: Positive for depression. Negative for memory loss and substance abuse. The patient is not nervous/anxious.    Allergic/Immunologic: Negative for persistent infections.       Objective:   Body mass index is 35.61 kg/m².  Vitals:    06/02/20 0832   BP: (!) 142/91   Pulse: 88                General    Constitutional: She is oriented to person, place, and time. She appears well-developed and well-nourished. No distress.   HENT:   Head: Normocephalic.   Eyes: EOM are normal.   Neck: Normal range of motion.   Pulmonary/Chest: Effort normal.   Neurological: She is oriented to person, place, and time.   Psychiatric: She has a normal mood and affect.             Right Hand/Wrist Exam     Inspection   Scars: Wrist - present Hand -  present  Effusion: Wrist - absent Hand -  absent    Pain   Wrist - The patient exhibits pain of the flexor/pronator group.    Other     Neuorologic Exam    Median Distribution: normal  Ulnar Distribution: normal  Radial Distribution: normal    Comments:  The suture line is intact right wrist.  There is no sign of infection.  There are no motor or sensory deficits.          Vascular Exam       Capillary Refill  Right Hand: normal capillary refill         radiographs were not obtained today  Assessment:     Encounter Diagnosis   Name Primary?    Carpal tunnel syndrome of right wrist Yes    status post right carpal tunnel release and right elbow lateral epicondyle injection    Plan:       The patient was given additional pain medication Norco 5 mg # 28 1 p.o. q.i.d. p.r.n. mild pain or  2 p.o. q.i.d. p.r.n. severe pain.  Wound care was discussed.  She was given a wrist splint.  The dressing was changed.  She will return in 2 weeks for suture removal.              Disclaimer: This note was prepared using a voice recognition system and is likely to have sound alike errors within the text.

## 2020-06-02 NOTE — TELEPHONE ENCOUNTER
"Carpal tunnel release today R wrist.unrelieved pain. Reports drsg cdi . No redness/swelling noted. Full sensation. Reports "just not relieved with norco 5mg prescribed".     [id number] awaiting further instructions per secure chat.  "

## 2020-06-02 NOTE — TELEPHONE ENCOUNTER
"  Reason for Disposition   Severe pain in the incision     Unrelieved pain, not new onset." I dont think anythings wrong, I just think RX is not strong enough".   [1] Post-op pain AND [2] not controlled with pain medications    Additional Information   Negative: [1] Major abdominal surgical incision AND [2] wound gaping open AND [3] internal organs visible   Negative: Sounds like a life-threatening emergency to the triager   Negative: Trauma wound (nonsurgical wound) shows signs of infection   Negative: [1] Bleeding from incision AND [2] won't stop after 10 minutes of direct pressure (using correct technique)   Negative: [1] Suture came out early AND [2] wound gaping open AND [3] < 48 hours since sutures placed   Negative: [1] Incision gaping open AND [2] length of opening > 1 inch (2.5 cm)   Negative: [1] Widespread rash AND [2] bright red, sunburn-like   Negative: Fever   Negative: Child sounds very sick or weak to the triager   Negative: Sounds like a serious complication to the triager   Negative: [1] Incision looks infected (spreading redness, increasing pain) AND [2] fever   Negative: [1] Incision looks infected (spreading redness) AND [2] large red area (> 2 in. or 5 cm)   Negative: [1] Incision looks infected (spreading redness) AND [2] face wound   Negative: [1] Red streak runs from the incision AND [2] longer than 1 inch (2.5 cm)   Negative: [1] Pus or bad-smelling fluid draining from incision AND [2] no fever    Protocols used: POST-OP INCISION SYMPTOMS-P-AH    "

## 2020-06-08 VITALS
BODY MASS INDEX: 35.75 KG/M2 | SYSTOLIC BLOOD PRESSURE: 128 MMHG | HEIGHT: 63 IN | OXYGEN SATURATION: 96 % | DIASTOLIC BLOOD PRESSURE: 74 MMHG | RESPIRATION RATE: 18 BRPM | TEMPERATURE: 98 F | HEART RATE: 84 BPM | WEIGHT: 201.75 LBS

## 2020-06-16 ENCOUNTER — OFFICE VISIT (OUTPATIENT)
Dept: ORTHOPEDICS | Facility: CLINIC | Age: 35
End: 2020-06-16
Payer: MEDICAID

## 2020-06-16 VITALS
DIASTOLIC BLOOD PRESSURE: 78 MMHG | SYSTOLIC BLOOD PRESSURE: 128 MMHG | HEIGHT: 63 IN | BODY MASS INDEX: 35.61 KG/M2 | WEIGHT: 201 LBS | HEART RATE: 99 BPM

## 2020-06-16 DIAGNOSIS — G56.01 CARPAL TUNNEL SYNDROME OF RIGHT WRIST: Primary | ICD-10-CM

## 2020-06-16 PROCEDURE — 99999 PR PBB SHADOW E&M-EST. PATIENT-LVL III: CPT | Mod: PBBFAC,,, | Performed by: ORTHOPAEDIC SURGERY

## 2020-06-16 PROCEDURE — 99024 POSTOP FOLLOW-UP VISIT: CPT | Mod: ,,, | Performed by: ORTHOPAEDIC SURGERY

## 2020-06-16 PROCEDURE — 99213 OFFICE O/P EST LOW 20 MIN: CPT | Mod: PBBFAC | Performed by: ORTHOPAEDIC SURGERY

## 2020-06-16 PROCEDURE — 99999 PR PBB SHADOW E&M-EST. PATIENT-LVL III: ICD-10-PCS | Mod: PBBFAC,,, | Performed by: ORTHOPAEDIC SURGERY

## 2020-06-16 PROCEDURE — 99024 PR POST-OP FOLLOW-UP VISIT: ICD-10-PCS | Mod: ,,, | Performed by: ORTHOPAEDIC SURGERY

## 2020-06-16 RX ORDER — SULFAMETHOXAZOLE AND TRIMETHOPRIM 800; 160 MG/1; MG/1
1 TABLET ORAL 2 TIMES DAILY
Qty: 20 TABLET | Refills: 0 | Status: SHIPPED | OUTPATIENT
Start: 2020-06-16 | End: 2020-07-08

## 2020-06-16 RX ORDER — SULFAMETHOXAZOLE AND TRIMETHOPRIM 800; 160 MG/1; MG/1
1 TABLET ORAL 2 TIMES DAILY
Qty: 20 TABLET | Refills: 0 | Status: SHIPPED | OUTPATIENT
Start: 2020-06-16 | End: 2020-06-16 | Stop reason: SDUPTHER

## 2020-06-16 NOTE — PROGRESS NOTES
Subjective:     Patient ID: Rosa Maria Jones is a 35 y.o. female.    Chief Complaint: Pain and Post-op Evaluation of the Right Wrist    The patient is a 35-year-old female seen in follow-up after a right carpal tunnel release date of surgery is 06/01/2020.  She was doing well until 2 days ago.  She had an episode of cooking on the grill.      Past Medical History:   Diagnosis Date    Abnormal Pap smear of cervix 2015    at [facility] with what sounds pt had [facility] all paps since normal    ADHD (attention deficit hyperactivity disorder)     Anxiety     Asthma     Depression     Diabetes type 2, controlled     Essential hypertension 9/8/2017    Insulin resistance     Obesity     PCOS (polycystic ovarian syndrome)     Tachycardia      Past Surgical History:   Procedure Laterality Date    CARPAL TUNNEL RELEASE Right 6/1/2020    Procedure: RELEASE, CARPAL TUNNEL;  Surgeon: Sebastian Wolfe MD;  Location: [facility];  Service: Orthopedics;  Laterality: Right;    cold knife cone  2015    [facility] - pap f/u was normal     INJECTION OF JOINT Right 6/1/2020    Procedure: INJECTION, JOINT;  Surgeon: Sebastian Wolfe MD;  Location: [facility] OR;  Service: Orthopedics;  Laterality: Right;  lateral epicondyle injection of the right elbow     Family History   Problem Relation Age of Onset    Diabetes Father     Cancer Father         Salivary gland    Hypertension Father     Breast cancer Maternal Grandmother      Social History     Socioeconomic History    Marital status:      Spouse name: Not on file    Number of children: Not on file    Years of education: Not on file    Highest education level: Not on file   Occupational History    Not on file   Social Needs    Financial resource strain: Not on file    Food insecurity     Worry: Not on file     Inability: Not on file    Transportation needs     Medical: Not on file     Non-medical:  Not on file   Tobacco Use    Smoking status: Current Every Day Smoker     Packs/day: 1.00     Types: Cigarettes    Smokeless tobacco: Never Used   Substance and Sexual Activity    Alcohol use: No    Drug use: No    Sexual activity: Yes     Partners: Male     Birth control/protection: OCP   Lifestyle    Physical activity     Days per week: Not on file     Minutes per session: Not on file    Stress: Not on file   Relationships    Social connections     Talks on phone: Not on file     Gets together: Not on file     Attends Faith service: Not on file     Active member of club or organization: Not on file     Attends meetings of clubs or organizations: Not on file     Relationship status: Not on file   Other Topics Concern    Not on file   Social History Narrative    Not on file     Medication List with Changes/Refills   Current Medications    ALBUTEROL (PROVENTIL/VENTOLIN HFA) 90 MCG/ACTUATION INHALER    INHALE 1 TO 2 PUFFS BY MOUTH EVERY 6 HOURS AS NEEDED    ALPRAZOLAM (XANAX) 1 MG TABLET    Take 1 tablet by mouth once daily.    DEXTROAMPHETAMINE-AMPHETAMINE (ADDERALL) 15 MG TABLET    Take 15 mg by mouth 4 (four) times daily.     FLUOXETINE 20 MG CAPSULE        FLUOXETINE 40 MG CAPSULE    Take 60 mg by mouth every evening.     HYDROCODONE-ACETAMINOPHEN (NORCO) 5-325 MG PER TABLET    Take 1 tablet by mouth every 6 (six) hours as needed for Pain.    LORAZEPAM (ATIVAN) 1 MG TABLET        NORETHINDRONE (ORTHO MICRONOR) 0.35 MG TABLET    Take 1 tablet (0.35 mg total) by mouth once daily.    NORGESTREL-ETHINYL ESTRADIOL (LO/OVRAL) 0.3-30 MG-MCG PER TABLET    Take 1 tablet by mouth.    RISPERIDONE (RISPERDAL) 2 MG TABLET    Take 1 mg by mouth every evening.      Review of patient's allergies indicates:   Allergen Reactions    Adhesive Blisters, Hives and Itching    Dairy aid [lactase]     Latex, natural rubber      Review of Systems   Constitution: Positive for malaise/fatigue.   HENT: Negative for hearing  loss.    Eyes: Negative for double vision and visual disturbance.   Cardiovascular: Negative for chest pain.   Respiratory: Positive for wheezing. Negative for shortness of breath.    Endocrine: Negative for cold intolerance.   Hematologic/Lymphatic: Does not bruise/bleed easily.   Skin: Negative for poor wound healing and suspicious lesions.   Musculoskeletal: Negative for gout, joint pain and joint swelling.   Gastrointestinal: Negative for nausea and vomiting.   Genitourinary: Negative for dysuria.   Neurological: Positive for numbness, paresthesias and sensory change.   Psychiatric/Behavioral: Positive for altered mental status and depression. Negative for memory loss and substance abuse. The patient is nervous/anxious.    Allergic/Immunologic: Negative for persistent infections.       Objective:   Body mass index is 35.61 kg/m².  Vitals:    06/16/20 1311   BP: 128/78   Pulse: 99                General    Constitutional: She is oriented to person, place, and time. She appears well-developed and well-nourished. No distress.   HENT:   Head: Normocephalic.   Eyes: EOM are normal.   Neck: Normal range of motion.   Pulmonary/Chest: Effort normal.   Neurological: She is oriented to person, place, and time.   Psychiatric: She has a normal mood and affect.             Right Hand/Wrist Exam     Inspection   Scars: Wrist - present   Effusion: Wrist - absent     Pain   Wrist - The patient exhibits pain of the flexor/pronator group.    Other     Neuorologic Exam    Median Distribution: normal  Ulnar Distribution: normal  Radial Distribution: normal    Comments:  The suture line is intact right wrist.  There is minimal erythema.  There is no sign of overt infection.  There are no motor or sensory deficits.          Vascular Exam       Capillary Refill  Right Hand: normal capillary refill         radiographs were not obtained today  Assessment:     Status post right carpal tunnel release    Plan:       The patient had the  sutures removed today.  Steri-Strips were applied.  Wound care was discussed.  She said she over did it the last 2 days.  She will be given Bactrim DS 20.  1 p.o. b.i.d. empirically.  She will return in 2 weeks to follow her progress              Disclaimer: This note was prepared using a voice recognition system and is likely to have sound alike errors within the text.

## 2020-06-24 DIAGNOSIS — G56.03 BILATERAL CARPAL TUNNEL SYNDROME: Primary | ICD-10-CM

## 2020-06-24 DIAGNOSIS — G56.02 LEFT CARPAL TUNNEL SYNDROME: ICD-10-CM

## 2020-07-07 ENCOUNTER — OFFICE VISIT (OUTPATIENT)
Dept: ORTHOPEDICS | Facility: CLINIC | Age: 35
End: 2020-07-07
Payer: MEDICAID

## 2020-07-07 VITALS
HEART RATE: 94 BPM | WEIGHT: 201 LBS | HEIGHT: 63 IN | BODY MASS INDEX: 35.61 KG/M2 | SYSTOLIC BLOOD PRESSURE: 136 MMHG | DIASTOLIC BLOOD PRESSURE: 89 MMHG

## 2020-07-07 DIAGNOSIS — G56.03 BILATERAL CARPAL TUNNEL SYNDROME: Primary | ICD-10-CM

## 2020-07-07 PROCEDURE — 99024 POSTOP FOLLOW-UP VISIT: CPT | Mod: ,,, | Performed by: ORTHOPAEDIC SURGERY

## 2020-07-07 PROCEDURE — 99024 PR POST-OP FOLLOW-UP VISIT: ICD-10-PCS | Mod: ,,, | Performed by: ORTHOPAEDIC SURGERY

## 2020-07-07 PROCEDURE — 99999 PR PBB SHADOW E&M-EST. PATIENT-LVL III: ICD-10-PCS | Mod: PBBFAC,,, | Performed by: ORTHOPAEDIC SURGERY

## 2020-07-07 PROCEDURE — 99999 PR PBB SHADOW E&M-EST. PATIENT-LVL III: CPT | Mod: PBBFAC,,, | Performed by: ORTHOPAEDIC SURGERY

## 2020-07-07 PROCEDURE — 99213 OFFICE O/P EST LOW 20 MIN: CPT | Mod: PBBFAC | Performed by: ORTHOPAEDIC SURGERY

## 2020-07-07 NOTE — PROGRESS NOTES
Subjective:     Patient ID: Rosa Maria Jones is a 35 y.o. female.    Chief Complaint: Pain of the Right Hand    The patient is status post right carpal tunnel release date of surgery is 06/01/2020.  She is quite pleased with those results.  She is scheduled for left carpal tunnel release 07/13/2020.  Nerve conduction studies did confirm bilateral carpal tunnel syndrome.      Past Medical History:   Diagnosis Date    Abnormal Pap smear of cervix 2015    at [facility] 3 with what sounds pt had [facility] all paps since normal    ADHD (attention deficit hyperactivity disorder)     Anxiety     Asthma     Depression     Diabetes type 2, controlled     Essential hypertension 9/8/2017    Insulin resistance     Obesity     PCOS (polycystic ovarian syndrome)     Tachycardia      Past Surgical History:   Procedure Laterality Date    CARPAL TUNNEL RELEASE Right 6/1/2020    Procedure: RELEASE, CARPAL TUNNEL;  Surgeon: Sebastian Wolfe MD;  Location: [facility];  Service: Orthopedics;  Laterality: Right;    cold knife cone  2015    [facility] - pap f/u was normal     INJECTION OF JOINT Right 6/1/2020    Procedure: INJECTION, JOINT;  Surgeon: Sebastian Wolfe MD;  Location: [facility] OR;  Service: Orthopedics;  Laterality: Right;  lateral epicondyle injection of the right elbow     Family History   Problem Relation Age of Onset    Diabetes Father     Cancer Father         Salivary gland    Hypertension Father     Breast cancer Maternal Grandmother      Social History     Socioeconomic History    Marital status:      Spouse name: Not on file    Number of children: Not on file    Years of education: Not on file    Highest education level: Not on file   Occupational History    Not on file   Social Needs    Financial resource strain: Not on file    Food insecurity     Worry: Not on file     Inability: Not on file    Transportation needs      Medical: Not on file     Non-medical: Not on file   Tobacco Use    Smoking status: Current Every Day Smoker     Packs/day: 1.00     Types: Cigarettes    Smokeless tobacco: Never Used   Substance and Sexual Activity    Alcohol use: No    Drug use: No    Sexual activity: Yes     Partners: Male     Birth control/protection: OCP   Lifestyle    Physical activity     Days per week: Not on file     Minutes per session: Not on file    Stress: Not on file   Relationships    Social connections     Talks on phone: Not on file     Gets together: Not on file     Attends Nondenominational service: Not on file     Active member of club or organization: Not on file     Attends meetings of clubs or organizations: Not on file     Relationship status: Not on file   Other Topics Concern    Not on file   Social History Narrative    Not on file     Medication List with Changes/Refills   Current Medications    ALBUTEROL (PROVENTIL/VENTOLIN HFA) 90 MCG/ACTUATION INHALER    INHALE 1 TO 2 PUFFS BY MOUTH EVERY 6 HOURS AS NEEDED    ALPRAZOLAM (XANAX) 1 MG TABLET    Take 1 tablet by mouth once daily.    DEXTROAMPHETAMINE-AMPHETAMINE (ADDERALL) 15 MG TABLET    Take 15 mg by mouth 4 (four) times daily.     FLUOXETINE 20 MG CAPSULE        FLUOXETINE 40 MG CAPSULE    Take 60 mg by mouth every evening.     HYDROCODONE-ACETAMINOPHEN (NORCO) 5-325 MG PER TABLET    Take 1 tablet by mouth every 6 (six) hours as needed for Pain.    LORAZEPAM (ATIVAN) 1 MG TABLET        NORETHINDRONE (ORTHO MICRONOR) 0.35 MG TABLET    Take 1 tablet (0.35 mg total) by mouth once daily.    NORGESTREL-ETHINYL ESTRADIOL (LO/OVRAL) 0.3-30 MG-MCG PER TABLET    Take 1 tablet by mouth.    RISPERIDONE (RISPERDAL) 2 MG TABLET    Take 1 mg by mouth every evening.     SULFAMETHOXAZOLE-TRIMETHOPRIM 800-160MG (BACTRIM DS) 800-160 MG TAB    Take 1 tablet by mouth 2 (two) times daily.    SULFAMETHOXAZOLE-TRIMETHOPRIM 800-160MG (BACTRIM DS) 800-160 MG TAB    Take 1 tablet by mouth 2  (two) times daily.     Review of patient's allergies indicates:   Allergen Reactions    Adhesive Blisters, Hives and Itching    Dairy aid [lactase]     Latex, natural rubber      Review of Systems   Constitution: Positive for malaise/fatigue.   HENT: Negative for hearing loss.    Eyes: Negative for double vision and visual disturbance.   Cardiovascular: Negative for chest pain.   Respiratory: Positive for wheezing. Negative for shortness of breath.    Endocrine: Negative for cold intolerance.   Hematologic/Lymphatic: Does not bruise/bleed easily.   Skin: Negative for poor wound healing and suspicious lesions.   Musculoskeletal: Negative for gout, joint pain and joint swelling.   Gastrointestinal: Negative for nausea and vomiting.   Genitourinary: Negative for dysuria.   Neurological: Positive for numbness, paresthesias and sensory change.   Psychiatric/Behavioral: Positive for altered mental status and depression. Negative for memory loss and substance abuse. The patient is not nervous/anxious.    Allergic/Immunologic: Negative for persistent infections.       Objective:   Body mass index is 35.61 kg/m².  Vitals:    07/07/20 1310   BP: 136/89   Pulse: 94                General    Constitutional: She is oriented to person, place, and time. She appears well-developed and well-nourished. No distress.   HENT:   Head: Normocephalic.   Mouth/Throat: Oropharynx is clear and moist.   Eyes: EOM are normal.   Neck: Normal range of motion.   Cardiovascular: Normal rate.    Pulmonary/Chest: Effort normal.   Abdominal: Soft.   Neurological: She is alert and oriented to person, place, and time. No cranial nerve deficit.   Psychiatric: She has a normal mood and affect.             Right Hand/Wrist Exam     Inspection   Scars: Wrist - present Hand -  present  Effusion: Wrist - absent Hand -  absent    Other     Neuorologic Exam    Median Distribution: normal  Ulnar Distribution: normal  Radial Distribution: normal    Comments:   There is a well-healed carpal tunnel scar right wrist.  There is no sign of infection.  There are no motor or sensory deficits.      Left Hand/Wrist Exam     Inspection   Scars: Wrist - absent Hand -  absent  Effusion: Wrist - absent Hand -  absent    Pain   Wrist - The patient exhibits pain of the flexor/pronator group.    Tests   Phalens sign: positive  Tinel's sign (median nerve): positive  Carpal Tunnel Compression Test: positive    Atrophy  Thenar:  Negative  Intrinsic: negative    Other     Sensory Exam  Median Distribution: abnormal  Ulnar Distribution: normal  Radial Distribution: normal    Comments:  The left wrist has positive Tinel and positive Phalen sign.  There is no thenar atrophy noted.          Vascular Exam       Capillary Refill  Right Hand: normal capillary refill  Left Hand: normal capillary refill      Relevant imaging results reviewed and interpreted by me, discussed with the patient and / or family today radiographs were not obtained today  Assessment:     Encounter Diagnosis   Name Primary?    Bilateral carpal tunnel syndrome Yes        Plan:       The patient wishes to have left carpal tunnel release.  She was counseled regarding the surgery.  Risk complications and alternatives were discussed including the risk of infection, anesthetic risk, injury to nerves and vessels, loss of motion, and possible need for additional surgeries were discussed.  She seems to understand and agree that surgery.  All questions were answered.  She is quite pleased with the results of the right side              Disclaimer: This note was prepared using a voice recognition system and is likely to have sound alike errors within the text.

## 2020-07-07 NOTE — H&P (VIEW-ONLY)
Subjective:     Patient ID: Rosa Maria Jones is a 35 y.o. female.    Chief Complaint: Pain of the Right Hand    The patient is status post right carpal tunnel release date of surgery is 06/01/2020.  She is quite pleased with those results.  She is scheduled for left carpal tunnel release 07/13/2020.  Nerve conduction studies did confirm bilateral carpal tunnel syndrome.      Past Medical History:   Diagnosis Date    Abnormal Pap smear of cervix 2015    at [facility] with what sounds pt had [facility] all paps since normal    ADHD (attention deficit hyperactivity disorder)     Anxiety     Asthma     Depression     Diabetes type 2, controlled     Essential hypertension 9/8/2017    Insulin resistance     Obesity     PCOS (polycystic ovarian syndrome)     Tachycardia      Past Surgical History:   Procedure Laterality Date    CARPAL TUNNEL RELEASE Right 6/1/2020    Procedure: RELEASE, CARPAL TUNNEL;  Surgeon: Sebastian Wolfe MD;  Location: [facility];  Service: Orthopedics;  Laterality: Right;    cold knife cone  2015    [facility] - pap f/u was normal     INJECTION OF JOINT Right 6/1/2020    Procedure: INJECTION, JOINT;  Surgeon: Sebastian Wolfe MD;  Location: [facility] OR;  Service: Orthopedics;  Laterality: Right;  lateral epicondyle injection of the right elbow     Family History   Problem Relation Age of Onset    Diabetes Father     Cancer Father         Salivary gland    Hypertension Father     Breast cancer Maternal Grandmother      Social History     Socioeconomic History    Marital status:      Spouse name: Not on file    Number of children: Not on file    Years of education: Not on file    Highest education level: Not on file   Occupational History    Not on file   Social Needs    Financial resource strain: Not on file    Food insecurity     Worry: Not on file     Inability: Not on file    Transportation needs      Medical: Not on file     Non-medical: Not on file   Tobacco Use    Smoking status: Current Every Day Smoker     Packs/day: 1.00     Types: Cigarettes    Smokeless tobacco: Never Used   Substance and Sexual Activity    Alcohol use: No    Drug use: No    Sexual activity: Yes     Partners: Male     Birth control/protection: OCP   Lifestyle    Physical activity     Days per week: Not on file     Minutes per session: Not on file    Stress: Not on file   Relationships    Social connections     Talks on phone: Not on file     Gets together: Not on file     Attends Tenriism service: Not on file     Active member of club or organization: Not on file     Attends meetings of clubs or organizations: Not on file     Relationship status: Not on file   Other Topics Concern    Not on file   Social History Narrative    Not on file     Medication List with Changes/Refills   Current Medications    ALBUTEROL (PROVENTIL/VENTOLIN HFA) 90 MCG/ACTUATION INHALER    INHALE 1 TO 2 PUFFS BY MOUTH EVERY 6 HOURS AS NEEDED    ALPRAZOLAM (XANAX) 1 MG TABLET    Take 1 tablet by mouth once daily.    DEXTROAMPHETAMINE-AMPHETAMINE (ADDERALL) 15 MG TABLET    Take 15 mg by mouth 4 (four) times daily.     FLUOXETINE 20 MG CAPSULE        FLUOXETINE 40 MG CAPSULE    Take 60 mg by mouth every evening.     HYDROCODONE-ACETAMINOPHEN (NORCO) 5-325 MG PER TABLET    Take 1 tablet by mouth every 6 (six) hours as needed for Pain.    LORAZEPAM (ATIVAN) 1 MG TABLET        NORETHINDRONE (ORTHO MICRONOR) 0.35 MG TABLET    Take 1 tablet (0.35 mg total) by mouth once daily.    NORGESTREL-ETHINYL ESTRADIOL (LO/OVRAL) 0.3-30 MG-MCG PER TABLET    Take 1 tablet by mouth.    RISPERIDONE (RISPERDAL) 2 MG TABLET    Take 1 mg by mouth every evening.     SULFAMETHOXAZOLE-TRIMETHOPRIM 800-160MG (BACTRIM DS) 800-160 MG TAB    Take 1 tablet by mouth 2 (two) times daily.    SULFAMETHOXAZOLE-TRIMETHOPRIM 800-160MG (BACTRIM DS) 800-160 MG TAB    Take 1 tablet by mouth 2  (two) times daily.     Review of patient's allergies indicates:   Allergen Reactions    Adhesive Blisters, Hives and Itching    Dairy aid [lactase]     Latex, natural rubber      Review of Systems   Constitution: Positive for malaise/fatigue.   HENT: Negative for hearing loss.    Eyes: Negative for double vision and visual disturbance.   Cardiovascular: Negative for chest pain.   Respiratory: Positive for wheezing. Negative for shortness of breath.    Endocrine: Negative for cold intolerance.   Hematologic/Lymphatic: Does not bruise/bleed easily.   Skin: Negative for poor wound healing and suspicious lesions.   Musculoskeletal: Negative for gout, joint pain and joint swelling.   Gastrointestinal: Negative for nausea and vomiting.   Genitourinary: Negative for dysuria.   Neurological: Positive for numbness, paresthesias and sensory change.   Psychiatric/Behavioral: Positive for altered mental status and depression. Negative for memory loss and substance abuse. The patient is not nervous/anxious.    Allergic/Immunologic: Negative for persistent infections.       Objective:   Body mass index is 35.61 kg/m².  Vitals:    07/07/20 1310   BP: 136/89   Pulse: 94                General    Constitutional: She is oriented to person, place, and time. She appears well-developed and well-nourished. No distress.   HENT:   Head: Normocephalic.   Mouth/Throat: Oropharynx is clear and moist.   Eyes: EOM are normal.   Neck: Normal range of motion.   Cardiovascular: Normal rate.    Pulmonary/Chest: Effort normal.   Abdominal: Soft.   Neurological: She is alert and oriented to person, place, and time. No cranial nerve deficit.   Psychiatric: She has a normal mood and affect.             Right Hand/Wrist Exam     Inspection   Scars: Wrist - present Hand -  present  Effusion: Wrist - absent Hand -  absent    Other     Neuorologic Exam    Median Distribution: normal  Ulnar Distribution: normal  Radial Distribution: normal    Comments:   There is a well-healed carpal tunnel scar right wrist.  There is no sign of infection.  There are no motor or sensory deficits.      Left Hand/Wrist Exam     Inspection   Scars: Wrist - absent Hand -  absent  Effusion: Wrist - absent Hand -  absent    Pain   Wrist - The patient exhibits pain of the flexor/pronator group.    Tests   Phalens sign: positive  Tinel's sign (median nerve): positive  Carpal Tunnel Compression Test: positive    Atrophy  Thenar:  Negative  Intrinsic: negative    Other     Sensory Exam  Median Distribution: abnormal  Ulnar Distribution: normal  Radial Distribution: normal    Comments:  The left wrist has positive Tinel and positive Phalen sign.  There is no thenar atrophy noted.          Vascular Exam       Capillary Refill  Right Hand: normal capillary refill  Left Hand: normal capillary refill      Relevant imaging results reviewed and interpreted by me, discussed with the patient and / or family today radiographs were not obtained today  Assessment:     Encounter Diagnosis   Name Primary?    Bilateral carpal tunnel syndrome Yes        Plan:       The patient wishes to have left carpal tunnel release.  She was counseled regarding the surgery.  Risk complications and alternatives were discussed including the risk of infection, anesthetic risk, injury to nerves and vessels, loss of motion, and possible need for additional surgeries were discussed.  She seems to understand and agree that surgery.  All questions were answered.  She is quite pleased with the results of the right side              Disclaimer: This note was prepared using a voice recognition system and is likely to have sound alike errors within the text.

## 2020-07-08 ENCOUNTER — HOSPITAL ENCOUNTER (OUTPATIENT)
Dept: PREADMISSION TESTING | Facility: HOSPITAL | Age: 35
Discharge: HOME | End: 2020-07-08
Attending: ORTHOPAEDIC SURGERY
Payer: MEDICAID

## 2020-07-08 VITALS
DIASTOLIC BLOOD PRESSURE: 93 MMHG | HEART RATE: 103 BPM | RESPIRATION RATE: 16 BRPM | SYSTOLIC BLOOD PRESSURE: 162 MMHG | OXYGEN SATURATION: 97 % | TEMPERATURE: 99 F

## 2020-07-08 DIAGNOSIS — Z01.818 PREOP TESTING: ICD-10-CM

## 2020-07-08 DIAGNOSIS — F31.9 BIPOLAR DEPRESSION: ICD-10-CM

## 2020-07-08 DIAGNOSIS — Z72.0 TOBACCO ABUSE: ICD-10-CM

## 2020-07-08 DIAGNOSIS — I10 ESSENTIAL HYPERTENSION: Chronic | ICD-10-CM

## 2020-07-08 DIAGNOSIS — R00.0 TACHYCARDIA: ICD-10-CM

## 2020-07-08 DIAGNOSIS — G56.02 CARPAL TUNNEL SYNDROME OF LEFT WRIST: ICD-10-CM

## 2020-07-08 PROBLEM — G56.01 RIGHT CARPAL TUNNEL SYNDROME: Status: RESOLVED | Noted: 2020-06-01 | Resolved: 2020-07-08

## 2020-07-08 NOTE — DISCHARGE INSTRUCTIONS
To confirm, Your doctor has instructed you that surgery is scheduled for 7/13/20.       Please report to [facility].  Pre admit office will call afternoon prior to surgery with final arrival time    INSTRUCTIONS IMPORTANT!!!   Do not eat, drink, or smoke after 12 midnight-including water. OK to brush teeth, no gum, candy or mints!    ¨ Take only these medicines with a small swallow of water-morning of surgery.  Birth Control    Pre operative instructions:  Please review the Pre-Operative Instruction booklet that you were given.        Bathing Instructions--See page 6 in the Pre-operative booklet.      Prevention of surgical site infections:     -Keep incisions clean and dry.   -Do not soak/submerge incisions in water until completely healed.   -Do not apply lotions, powders, creams, or deodorants to site.   -Always make sure hands are cleaned with antibacterial soap/ alcohol-based  prior to touching the surgical site.  (This includes doctors, nurses, staff, and yourself.)    Signs and symptoms:   -Redness and pain around the area where you had surgery   -Drainage of cloudy fluid from your surgical wound   -Fever over 100.4       I have read or had read and explained to me, and understand the above information.  Additional comments or instructions:  Received a copy of Pre-operative instructions booklet, FAQ surgical site infection sheet, and packets of hibiclens (if indicated).

## 2020-07-09 ENCOUNTER — ANESTHESIA EVENT (OUTPATIENT)
Dept: SURGERY | Facility: HOSPITAL | Age: 35
End: 2020-07-09
Payer: MEDICAID

## 2020-07-09 DIAGNOSIS — Z01.818 PREOP TESTING: Primary | ICD-10-CM

## 2020-07-09 PROBLEM — Z72.0 TOBACCO ABUSE: Chronic | Status: ACTIVE | Noted: 2020-07-09

## 2020-07-09 PROBLEM — Z72.0 TOBACCO ABUSE: Status: ACTIVE | Noted: 2020-07-09

## 2020-07-09 RX ORDER — FAMOTIDINE 20 MG/1
20 TABLET, FILM COATED ORAL
Status: CANCELLED | OUTPATIENT
Start: 2020-07-09 | End: 2020-07-09

## 2020-07-09 RX ORDER — ACETAMINOPHEN 500 MG
1000 TABLET ORAL
Status: CANCELLED | OUTPATIENT
Start: 2020-07-09 | End: 2020-07-09

## 2020-07-09 RX ORDER — ALPRAZOLAM 0.5 MG/1
0.5 TABLET ORAL ONCE AS NEEDED
Status: CANCELLED | OUTPATIENT
Start: 2020-07-09 | End: 2031-12-06

## 2020-07-09 NOTE — H&P
Preoperative History and Physical                                                             Hospital Medicine      Chief Complaint: Preoperative evaluation     History of Present Illness:      Rosa Maria Jones is a 35 y.o. female who presents to the office today for a preoperative consultation at the request of Dr. Wolfe who plans on performing Left carpal tunnel release on 7/13/20.  Functional Status:      The patient is able to climb a flight of stairs. The patient is able to ambulate 1-2 blocks without difficulty. The patient's functional status is affected by the surgical problem. The patient's functional status is not affected by shortness of breath, chest pain, dyspnea on exertion and fatigue.    MET score greater than 4    Past Medical History:      Past Medical History:   Diagnosis Date    Abnormal Pap smear of cervix 2015    at [facility] 3 with what sounds pt had [facility] all paps since normal    ADHD (attention deficit hyperactivity disorder)     Anxiety     Asthma     Bipolar disorder     with psychotic features    Diabetes type 2, controlled     Essential hypertension 9/8/2017    Obesity     OCD (obsessive compulsive disorder)     PCOS (polycystic ovarian syndrome)     PTSD (post-traumatic stress disorder)     Tachycardia     chronic         Past Surgical History:      Past Surgical History:   Procedure Laterality Date    CARPAL TUNNEL RELEASE Right 6/1/2020    Procedure: RELEASE, CARPAL TUNNEL;  Surgeon: Sebastian Wolfe MD;  Location: [facility] OR;  Service: Orthopedics;  Laterality: Right;    CONIZATION OF CERVIX USING LOOP ELECTROSURGICAL EXCISION PROCEDURE (LEEP)  2015    [facility] - pap f/u was normal     INJECTION OF JOINT Right 6/1/2020    Procedure: INJECTION, JOINT;  Surgeon: Sebastian Wolfe MD;  Location: [facility] OR;  Service: Orthopedics;  Laterality: Right;  lateral  epicondyle injection of the right elbow        Social History:      Social History     Socioeconomic History    Marital status:      Spouse name: Not on file    Number of children: Not on file    Years of education: Not on file    Highest education level: Not on file   Occupational History    Not on file   Social Needs    Financial resource strain: Not on file    Food insecurity     Worry: Not on file     Inability: Not on file    Transportation needs     Medical: Not on file     Non-medical: Not on file   Tobacco Use    Smoking status: Current Every Day Smoker     Packs/day: 1.00     Years: 15.00     Pack years: 15.00     Types: Cigarettes    Smokeless tobacco: Never Used   Substance and Sexual Activity    Alcohol use: Never     Frequency: Never    Drug use: No    Sexual activity: Yes     Partners: Male     Birth control/protection: OCP   Lifestyle    Physical activity     Days per week: Not on file     Minutes per session: Not on file    Stress: Not on file   Relationships    Social connections     Talks on phone: Not on file     Gets together: Not on file     Attends [employer] service: Not on file     Active member of club or organization: Not on file     Attends meetings of clubs or organizations: Not on file     Relationship status: Not on file   Other Topics Concern    Not on file   Social History Narrative    Not on file        Family History:      Family History   Problem Relation Age of Onset    Diabetes Father     Hypertension Father     Cancer Father     Kidney disease Father     Breast cancer Maternal Grandmother     Diabetes Mother     Rheum arthritis Mother     No Known Problems Sister     No Known Problems Brother        Allergies:      Review of patient's allergies indicates:   Allergen Reactions    Dairy aid [lactase] Swelling     Allergic to all dairy products     Adhesive Blisters, Hives and Itching    Latex, natural rubber Rash       Medications:      Current  Outpatient Medications   Medication Sig    alprazolam (XANAX) 1 MG tablet Take 1 tablet by mouth 2 (two) times daily as needed for Anxiety.     dextroamphetamine-amphetamine (ADDERALL) 15 mg tablet Take 15 mg by mouth 4 (four) times daily.     FLUoxetine 40 MG capsule Take 80 mg by mouth every evening.     norethindrone (ORTHO MICRONOR) 0.35 mg tablet Take 1 tablet (0.35 mg total) by mouth once daily.    risperiDONE (RISPERDAL) 2 MG tablet Take 1 mg by mouth every evening.     semaglutide (OZEMPIC SUBQ) Inject into the skin once a week.    albuterol (PROVENTIL/VENTOLIN HFA) 90 mcg/actuation inhaler INHALE 1 TO 2 PUFFS BY MOUTH EVERY 6 HOURS AS NEEDED (Patient not taking: Reported on 7/8/2020)    LORazepam (ATIVAN) 1 MG tablet Take 1 mg by mouth daily as needed.      No current facility-administered medications for this encounter.        Vitals:      Vitals:    07/08/20 1519   BP: (!) 162/93   Pulse: 103   Resp: 16   Temp: 98.8 °F (37.1 °C)       Review of Systems:        Constitutional: +weight gain Negative for fever, chills, weight loss, malaise/fatigue and diaphoresis.   HENT: Negative for hearing loss, ear pain, nosebleeds, congestion, sore throat, neck pain, tinnitus and ear discharge.    Eyes: Negative for blurred vision, double vision, photophobia, pain, discharge and redness.   Respiratory: Negative for cough, hemoptysis, sputum production, shortness of breath, wheezing and stridor.    Cardiovascular: Negative for chest pain, palpitations, orthopnea, claudication, leg swelling and PND.   Gastrointestinal: Negative for heartburn, nausea, vomiting, abdominal pain, diarrhea, constipation, blood in stool and melena.   Genitourinary: Negative for dysuria, urgency, frequency, hematuria and flank pain.   Musculoskeletal: +numbness and pain to left wrist/hand Negative for myalgias, back pain, joint pain and falls.   Skin: Negative for itching and rash.   Neurological: Negative for dizziness, tingling,  tremors, sensory change, speech change, focal weakness, seizures, loss of consciousness, weakness and headaches.   Endo/Heme/Allergies: Negative for environmental allergies and polydipsia. Does not bruise/bleed easily.   Psychiatric/Behavioral: Negative for depression, suicidal ideas, hallucinations, memory loss and substance abuse. The patient is not nervous/anxious and does not have insomnia.    All 14 systems reviewed and negative except as noted above.    Physical Exam:      Constitutional: Appears well-developed, well-nourished and in no acute distress.  Patient is oriented to person, place, and time.   Head: Normocephalic and atraumatic. Mucous membranes moist.  Neck: Neck supple no mass.   Cardiovascular: Normal rate and regular rhythm.  S1 S2 appreciated by ascultation.  Pulmonary/Chest: Effort normal and clear to auscultation bilaterally. No respiratory distress.   Abdomen: Soft. Non-tender and non-distended. Bowel sounds are normal.   Neurological: Patient is alert and oriented to person, place and time. Moves all extremities.  Skin: Warm and dry. No lesions.  Extremities: No clubbing, cyanosis or edema.    Laboratory data:      Reviewed and noted in plan where applicable. Please see chart for full laboratory data.    No results for input(s): CPK, CPKMB, TROPONINI, MB in the last 24 hours. No results for input(s): POCTGLUCOSE in the last 24 hours.     No results found for: INR, PROTIME    Lab Results   Component Value Date    WBC 9.87 05/29/2020    HGB 14.1 05/29/2020    HCT 39.9 05/29/2020    MCV 85 05/29/2020     05/29/2020       No results for input(s): GLU, NA, K, CL, CO2, BUN, CREATININE, CALCIUM, MG in the last 24 hours.    Predictors of intubation difficulty:       Morbid obesity? No -BMI 35   Anatomically abnormal facies? no   Prominent incisors? no   Receding mandible? no   Short, thick neck? no   Neck range of motion: normal    Cardiographics:    Not required  Imaging:    Chest x-ray:not  required     Assessment and Plan:      Carpal tunnel syndrome of left wrist  The patient is scheduled for Left carpal tunnel release on 7/13/20 by Dr. Wolfe     Known risk factors for perioperative complications:     Chronic tachycardia   DM    Difficulty with intubation is not anticipated.    Cardiac Risk Estimation: low intraoperative cardiac risk     1.) Preoperative workup as follows: none.  2.) Change in medication regimen before surgery: Hold Adderall prior to surgery  3.) Prophylaxis for cardiac events with perioperative beta-blockers: not indicated.  4.) Invasive hemodynamic monitoring perioperatively: not indicated.  5.) Deep vein thrombosis prophylaxis postoperatively: regimen to be chosen by surgical team.  6.) Surveillance for postoperative MI with ECG immediately postoperatively and on postoperati ve days 1 and 2 AND troponin levels 24 hours postoperatively and on day 4 or hospital discharge (whichever comes first): not indicated.  7.) Current medications which may produce withdrawal symptoms if withheld perioperatively: Xanax, Ativan   8.) Other measures: Preoperative tobacco abstention    Type II or unspecified type diabetes mellitus without mention of complication, not stated as uncontrolled  Last Hgb A1c was 7.0 on 5/29/20  Pt was just prescribed Ozempic   Cont ADA diet     Tachycardia  Pt reports chronic tachycardia   Denies symptoms of dizziness, palpitations, CP, or SOB   According to the patient, she has  been evaluated by cardiologist Dr. Youngblood in the past who determined it was benign  Last TSH was WNL   Likely secondary to frequent dosing of Adderall     Essential hypertension  /93 in PAT  Pt reports her PCP watches her BP-according to patient, last time she took antihypertensives her BP went to low   Counseled pt to follow a 2gm NA diet     Bipolar depression  Pt reports she is followed by Psychiatry and has been diagnosed with PTSD, OCD, Anxiety, and Bipolar d/o   She reports  good control with her current medication regimen   Cont Prozac daily, Risperdal nightly and Xanax/Ativan prn     Asthma  Pt reports infrequent exacerbations-mild intermittent   She reports several months since using Albuterol   She is not followed by a Pulmonologist- no recent PFTs  No wheezing on exam, Pulse ox 97%  Counseled on smoking cessation     Tobacco abuse  Smoking cessation education provided

## 2020-07-09 NOTE — ASSESSMENT & PLAN NOTE
Pt reports she is followed by Psychiatry and has been diagnosed with PTSD, OCD, Anxiety, and Bipolar d/o   She reports good control with her current medication regimen   Cont Prozac daily, Risperdal nightly and Xanax/Ativan prn

## 2020-07-09 NOTE — ANESTHESIA PREPROCEDURE EVALUATION
07/09/2020  Rosa Maria Jones is a 35 y.o., female.    Anesthesia Evaluation    I have reviewed the Patient Summary Reports.    I have reviewed the Nursing Notes. I have reviewed the NPO Status.   I have reviewed the Medications.     Review of Systems  Anesthesia Hx:  History of prior surgery of interest to airway management or planning: Previous anesthesia: MAC  6/2020 - carpal tunnel with MAC.  Procedure performed at an [facility].  Denies Personal Hx of Anesthesia complications.   Social:  Smoker    Pulmonary:   Asthma mild    Hepatic/GI:   Denies GERD.    Endocrine:   Diabetes, type 2    Psych:   anxiety depression  Attention Deficit Disorder.         Physical Exam  General:  Obesity    Airway/Jaw/Neck:  Airway Findings: General Airway Assessment: Adult           Mental Status:  Mental Status Findings:  Cooperative, Alert and Oriented         Anesthesia Plan  Type of Anesthesia, risks & benefits discussed:  Anesthesia Type:  MAC  Patient's Preference:   Intra-op Monitoring Plan: standard ASA monitors  Intra-op Monitoring Plan Comments:   Post Op Pain Control Plan:   Post Op Pain Control Plan Comments:   Induction:   IV  Beta Blocker:  Patient is not currently on a Beta-Blocker (No further documentation required).       Informed Consent: Patient understands risks and agrees with Anesthesia plan.  Questions answered. Anesthesia consent signed with patient.  ASA Score: 2     Day of Surgery Review of History & Physical:    H&P update referred to the surgeon.         Ready For Surgery From Anesthesia Perspective.

## 2020-07-09 NOTE — ASSESSMENT & PLAN NOTE
/93 in PAT  Pt reports her PCP watches her BP-according to patient, last time she took antihypertensives her BP went to low   Counseled pt to follow a 2gm NA diet

## 2020-07-09 NOTE — ASSESSMENT & PLAN NOTE
Pt reports infrequent exacerbations-mild intermittent   She reports several months since using Albuterol   She is not followed by a Pulmonologist- no recent PFTs  No wheezing on exam, Pulse ox 97%  Counseled on smoking cessation

## 2020-07-09 NOTE — ASSESSMENT & PLAN NOTE
The patient is scheduled for left carpal tunnel release on 7/13/20 by Dr. Wolfe     Known risk factors for perioperative complications:     Chronic tachycardia   DM    Difficulty with intubation is not anticipated.    Cardiac Risk Estimation: low intraoperative cardiac risk     1.) Preoperative workup as follows: none.  2.) Change in medication regimen before surgery: Hold Adderall prior to surgery  3.) Prophylaxis for cardiac events with perioperative beta-blockers: not indicated.  4.) Invasive hemodynamic monitoring perioperatively: not indicated.  5.) Deep vein thrombosis prophylaxis postoperatively: regimen to be chosen by surgical team.  6.) Surveillance for postoperative MI with ECG immediately postoperatively and on postoperati ve days 1 and 2 AND troponin levels 24 hours postoperatively and on day 4 or hospital discharge (whichever comes first): not indicated.  7.) Current medications which may produce withdrawal symptoms if withheld perioperatively: Xanax, Ativan   8.) Other measures: Preoperative tobacco abstention

## 2020-07-09 NOTE — ASSESSMENT & PLAN NOTE
Pt reports chronic tachycardia   Denies symptoms of dizziness, palpitations, CP, or SOB   According to the patient, she has  been evaluated by cardiologist Dr. Youngblood in the past who determined it was benign  Last TSH was WNL   Likely secondary to frequent dosing of Adderall

## 2020-07-10 ENCOUNTER — LAB VISIT (OUTPATIENT)
Dept: OTOLARYNGOLOGY | Facility: CLINIC | Age: 35
End: 2020-07-10
Payer: MEDICAID

## 2020-07-10 DIAGNOSIS — Z01.818 PREOP TESTING: ICD-10-CM

## 2020-07-10 PROCEDURE — U0003 INFECTIOUS AGENT DETECTION BY NUCLEIC ACID (DNA OR RNA); SEVERE ACUTE RESPIRATORY SYNDROME CORONAVIRUS 2 (SARS-COV-2) (CORONAVIRUS DISEASE [COVID-19]), AMPLIFIED PROBE TECHNIQUE, MAKING USE OF HIGH THROUGHPUT TECHNOLOGIES AS DESCRIBED BY CMS-2020-01-R: HCPCS

## 2020-07-10 NOTE — PRE ADMISSION SCREENING
Arrival time 0800 @ [facility]. NPO status reinforced. Medications reviewed. Visitor policy discussed

## 2020-07-11 LAB — SARS-COV-2 RNA RESP QL NAA+PROBE: NOT DETECTED

## 2020-07-13 ENCOUNTER — HOSPITAL ENCOUNTER (OUTPATIENT)
Facility: HOSPITAL | Age: 35
Discharge: HOME | End: 2020-07-13
Attending: ORTHOPAEDIC SURGERY | Admitting: ORTHOPAEDIC SURGERY
Payer: MEDICAID

## 2020-07-13 ENCOUNTER — NURSE TRIAGE (OUTPATIENT)
Dept: ADMINISTRATIVE | Facility: CLINIC | Age: 35
End: 2020-07-13

## 2020-07-13 ENCOUNTER — ANESTHESIA (OUTPATIENT)
Dept: SURGERY | Facility: HOSPITAL | Age: 35
End: 2020-07-13
Payer: MEDICAID

## 2020-07-13 VITALS
RESPIRATION RATE: 19 BRPM | OXYGEN SATURATION: 100 % | BODY MASS INDEX: 36.99 KG/M2 | HEIGHT: 63 IN | DIASTOLIC BLOOD PRESSURE: 78 MMHG | HEART RATE: 86 BPM | WEIGHT: 208.75 LBS | SYSTOLIC BLOOD PRESSURE: 142 MMHG | TEMPERATURE: 98 F

## 2020-07-13 DIAGNOSIS — G56.02 CARPAL TUNNEL SYNDROME OF LEFT WRIST: Primary | ICD-10-CM

## 2020-07-13 DIAGNOSIS — Z01.818 PREOP TESTING: ICD-10-CM

## 2020-07-13 LAB
B-HCG UR QL: NEGATIVE
CTP QC/QA: YES
POCT GLUCOSE: 128 MG/DL (ref 70–110)

## 2020-07-13 PROCEDURE — 37000008 HC ANESTHESIA 1ST 15 MINUTES: Performed by: ORTHOPAEDIC SURGERY

## 2020-07-13 PROCEDURE — 01810 ANES PX NRV MUSC F/ARM WRST: CPT | Performed by: ORTHOPAEDIC SURGERY

## 2020-07-13 PROCEDURE — 25000003 PHARM REV CODE 250: Performed by: ANESTHESIOLOGY

## 2020-07-13 PROCEDURE — 37000009 HC ANESTHESIA EA ADD 15 MINS: Performed by: ORTHOPAEDIC SURGERY

## 2020-07-13 PROCEDURE — D9220A PRA ANESTHESIA: Mod: ,,, | Performed by: ANESTHESIOLOGY

## 2020-07-13 PROCEDURE — 25000003 PHARM REV CODE 250: Performed by: NURSE PRACTITIONER

## 2020-07-13 PROCEDURE — D9220A PRA ANESTHESIA: ICD-10-PCS | Mod: ,,, | Performed by: ANESTHESIOLOGY

## 2020-07-13 PROCEDURE — 36000706: Performed by: ORTHOPAEDIC SURGERY

## 2020-07-13 PROCEDURE — 63600175 PHARM REV CODE 636 W HCPCS: Performed by: ANESTHESIOLOGY

## 2020-07-13 PROCEDURE — 81025 URINE PREGNANCY TEST: CPT | Performed by: NURSE PRACTITIONER

## 2020-07-13 PROCEDURE — 71000033 HC RECOVERY, INTIAL HOUR: Performed by: ORTHOPAEDIC SURGERY

## 2020-07-13 PROCEDURE — 36000707: Performed by: ORTHOPAEDIC SURGERY

## 2020-07-13 PROCEDURE — 64721 CARPAL TUNNEL SURGERY: CPT | Mod: 79,LT,, | Performed by: ORTHOPAEDIC SURGERY

## 2020-07-13 PROCEDURE — 64721 PR REVISE MEDIAN N/CARPAL TUNNEL SURG: ICD-10-PCS | Mod: 79,LT,, | Performed by: ORTHOPAEDIC SURGERY

## 2020-07-13 PROCEDURE — 25000003 PHARM REV CODE 250: Performed by: ORTHOPAEDIC SURGERY

## 2020-07-13 PROCEDURE — 71000015 HC POSTOP RECOV 1ST HR: Performed by: ORTHOPAEDIC SURGERY

## 2020-07-13 RX ORDER — KETAMINE HYDROCHLORIDE 50 MG/ML
INJECTION, SOLUTION INTRAMUSCULAR; INTRAVENOUS
Status: DISCONTINUED | OUTPATIENT
Start: 2020-07-13 | End: 2020-07-13

## 2020-07-13 RX ORDER — PROPOFOL 10 MG/ML
VIAL (ML) INTRAVENOUS CONTINUOUS PRN
Status: DISCONTINUED | OUTPATIENT
Start: 2020-07-13 | End: 2020-07-13

## 2020-07-13 RX ORDER — FAMOTIDINE 20 MG/1
20 TABLET, FILM COATED ORAL
Status: COMPLETED | OUTPATIENT
Start: 2020-07-13 | End: 2020-07-13

## 2020-07-13 RX ORDER — CHLORHEXIDINE GLUCONATE ORAL RINSE 1.2 MG/ML
10 SOLUTION DENTAL
Status: DISCONTINUED | OUTPATIENT
Start: 2020-07-13 | End: 2020-07-13 | Stop reason: HOSPADM

## 2020-07-13 RX ORDER — ACETAMINOPHEN 500 MG
1000 TABLET ORAL
Status: COMPLETED | OUTPATIENT
Start: 2020-07-13 | End: 2020-07-13

## 2020-07-13 RX ORDER — SODIUM CHLORIDE, SODIUM LACTATE, POTASSIUM CHLORIDE, CALCIUM CHLORIDE 600; 310; 30; 20 MG/100ML; MG/100ML; MG/100ML; MG/100ML
INJECTION, SOLUTION INTRAVENOUS CONTINUOUS
Status: ACTIVE | OUTPATIENT
Start: 2020-07-13 | End: 2020-07-14

## 2020-07-13 RX ORDER — LIDOCAINE HYDROCHLORIDE 20 MG/ML
INJECTION, SOLUTION EPIDURAL; INFILTRATION; INTRACAUDAL; PERINEURAL
Status: DISCONTINUED | OUTPATIENT
Start: 2020-07-13 | End: 2020-07-13 | Stop reason: HOSPADM

## 2020-07-13 RX ORDER — HYDROCODONE BITARTRATE AND ACETAMINOPHEN 10; 325 MG/1; MG/1
1 TABLET ORAL EVERY 6 HOURS PRN
Qty: 15 TABLET | Refills: 0 | Status: SHIPPED | OUTPATIENT
Start: 2020-07-13 | End: 2020-09-22

## 2020-07-13 RX ORDER — LIDOCAINE HYDROCHLORIDE 20 MG/ML
INJECTION, SOLUTION INFILTRATION; PERINEURAL
Status: DISCONTINUED
Start: 2020-07-13 | End: 2020-07-13 | Stop reason: HOSPADM

## 2020-07-13 RX ORDER — FENTANYL CITRATE 50 UG/ML
INJECTION, SOLUTION INTRAMUSCULAR; INTRAVENOUS
Status: DISCONTINUED | OUTPATIENT
Start: 2020-07-13 | End: 2020-07-13

## 2020-07-13 RX ORDER — ALPRAZOLAM 0.5 MG/1
0.5 TABLET ORAL ONCE AS NEEDED
Status: COMPLETED | OUTPATIENT
Start: 2020-07-13 | End: 2020-07-13

## 2020-07-13 RX ORDER — NAPROXEN 250 MG/1
500 TABLET ORAL ONCE
Status: COMPLETED | OUTPATIENT
Start: 2020-07-13 | End: 2020-07-13

## 2020-07-13 RX ORDER — CEFAZOLIN SODIUM 2 G/50ML
2 SOLUTION INTRAVENOUS
Status: DISCONTINUED | OUTPATIENT
Start: 2020-07-13 | End: 2020-07-13 | Stop reason: HOSPADM

## 2020-07-13 RX ORDER — LIDOCAINE HYDROCHLORIDE 10 MG/ML
0.5 INJECTION, SOLUTION EPIDURAL; INFILTRATION; INTRACAUDAL; PERINEURAL ONCE
Status: ACTIVE | OUTPATIENT
Start: 2020-07-13

## 2020-07-13 RX ORDER — CEFAZOLIN SODIUM 2 G/50ML
SOLUTION INTRAVENOUS
Status: DISCONTINUED
Start: 2020-07-13 | End: 2020-07-13 | Stop reason: HOSPADM

## 2020-07-13 RX ORDER — CHLORHEXIDINE GLUCONATE ORAL RINSE 1.2 MG/ML
10 SOLUTION DENTAL 2 TIMES DAILY
Status: DISCONTINUED | OUTPATIENT
Start: 2020-07-13 | End: 2020-07-13 | Stop reason: HOSPADM

## 2020-07-13 RX ORDER — PROPOFOL 10 MG/ML
VIAL (ML) INTRAVENOUS
Status: DISCONTINUED | OUTPATIENT
Start: 2020-07-13 | End: 2020-07-13

## 2020-07-13 RX ADMIN — DEXTROSE 2 G: 50 INJECTION, SOLUTION INTRAVENOUS at 10:07

## 2020-07-13 RX ADMIN — KETAMINE HYDROCHLORIDE 5 MG: 50 INJECTION, SOLUTION INTRAMUSCULAR; INTRAVENOUS at 10:07

## 2020-07-13 RX ADMIN — PROPOFOL 50 MG: 10 INJECTION, EMULSION INTRAVENOUS at 10:07

## 2020-07-13 RX ADMIN — PROPOFOL 75 MCG/KG/MIN: 10 INJECTION, EMULSION INTRAVENOUS at 10:07

## 2020-07-13 RX ADMIN — FENTANYL CITRATE 100 MCG: 50 INJECTION, SOLUTION INTRAMUSCULAR; INTRAVENOUS at 10:07

## 2020-07-13 RX ADMIN — FAMOTIDINE 20 MG: 20 TABLET ORAL at 09:07

## 2020-07-13 RX ADMIN — ACETAMINOPHEN 1000 MG: 500 TABLET ORAL at 09:07

## 2020-07-13 RX ADMIN — ALPRAZOLAM 0.5 MG: 0.5 TABLET ORAL at 09:07

## 2020-07-13 RX ADMIN — CHLORHEXIDINE GLUCONATE 0.12% ORAL RINSE 10 ML: 1.2 LIQUID ORAL at 09:07

## 2020-07-13 RX ADMIN — NAPROXEN 500 MG: 250 TABLET ORAL at 09:07

## 2020-07-13 NOTE — ANESTHESIA POSTPROCEDURE EVALUATION
Anesthesia Post Evaluation    Patient: Rosa Maria Jones    Procedure(s) Performed: Procedure(s) (LRB):  RELEASE, CARPAL TUNNEL (Left)    Final Anesthesia Type: MAC    Patient location during evaluation: PACU  Patient participation: Yes- Able to Participate  Level of consciousness: awake and alert and oriented  Post-procedure vital signs: reviewed and stable  Pain management: adequate  Airway patency: patent    PONV status at discharge: No PONV  Anesthetic complications: no      Cardiovascular status: hemodynamically stable  Respiratory status: unassisted, spontaneous ventilation and room air  Hydration status: euvolemic  Follow-up not needed.          Vitals Value Taken Time   /80 07/13/20 1105   Temp 36.6 °C (97.9 °F) 07/13/20 1048   Pulse 86 07/13/20 1105   Resp 20 07/13/20 1105   SpO2 99 % 07/13/20 1105   Vitals shown include unvalidated device data.      No case tracking events are documented in the log.      Pain/Berenice Score: Pain Rating Prior to Med Admin: 0 (7/13/2020  9:12 AM)  Berenice Score: 10 (7/13/2020 11:00 AM)

## 2020-07-13 NOTE — DISCHARGE SUMMARY
[facility] [place] [facility]  Discharge Note  Short Stay    OUTCOME: Patient tolerated treatment/procedure well without complication and is now ready for discharge.    DISPOSITION: Home or Self Care    FINAL DIAGNOSIS:  Carpal tunnel syndrome of left wrist    FOLLOWUP: In orthopedic clinic

## 2020-07-13 NOTE — OP NOTE
[facility]  Orthopedic Surgery  Operative Note    SUMMARY     Date of Procedure: 7/13/2020   Assistant: None    Procedure: Procedure(s) (LRB):  RELEASE, CARPAL TUNNEL (Left)       Surgeon(s) and Role:     * Sebastian Wolfe MD - Primary    Assisting Surgeon: None    Pre-Operative Diagnosis: Bilateral carpal tunnel syndrome [G56.03]  Left carpal tunnel syndrome [G56.02]    Post-Operative Diagnosis: Post-Op Diagnosis Codes:     * Bilateral carpal tunnel syndrome [G56.03]     * Left carpal tunnel syndrome [G56.02]    Anesthesia:  Local with sedation    Technical Procedures Used:  left carpal tunnel release    Description of the Findings of the Procedure:  The patient was taken to the operating room where 10 cc of 2% plain lidocaine was used for local anesthetic and was administered.  Satisfactory anesthesia had been achieved the left hand was prepped and draped in the usual sterile fashion.  The patient did receive 2 g of Ancef intravenously preoperatively.  At this time a longitudinal incision was made in line with the 4th ray over the transverse carpal ligament.  The incision was extended using blunt and sharp dissection under 3.5 loupe magnification.  The transverse carpal ligament was identified and sharply incised under direct vision.  A complete release was performed and verified by the surgeon's small finger both proximally and distally.  No additional pathology was encountered.  Satisfactory release had been achieved skin was closed using horizontal mattress 4 0 nylon suture.  Xeroform gauze 4x4s and 2 ABD pads were over wrapped with 3 in gauze dressing.  The patient tolerated the procedure well and was transferred to the recovery room in satisfactory condition.      Complications: No    Estimated Blood Loss (EBL): 5cc                        Condition: Good    Disposition: PACU - hemodynamically stable.    Attestation: I was present and scrubbed for the entire procedure.

## 2020-07-13 NOTE — INTERVAL H&P NOTE
The patient has been examined and the H&P has been reviewed:    I concur with the findings and no changes have occurred since H&P was written.    Anesthesia/Surgery risks, benefits and alternative options discussed and understood by patient/family.          Active Hospital Problems    Diagnosis  POA    *Carpal tunnel syndrome of left wrist [G56.02]  Yes      Resolved Hospital Problems   No resolved problems to display.

## 2020-07-13 NOTE — OR NURSING
Wasted 487 mg of Ketamine for Rosa Maria Beededrarachid at 1634 on 7/13/2020. Witnessed by MONTY Moreira.    Witnessed by MONTY Moreira

## 2020-07-14 ENCOUNTER — HOSPITAL ENCOUNTER (EMERGENCY)
Facility: HOSPITAL | Age: 35
Discharge: HOME | End: 2020-07-14
Attending: EMERGENCY MEDICINE
Payer: MEDICAID

## 2020-07-14 VITALS
HEART RATE: 81 BPM | HEIGHT: 63 IN | RESPIRATION RATE: 17 BRPM | DIASTOLIC BLOOD PRESSURE: 79 MMHG | SYSTOLIC BLOOD PRESSURE: 149 MMHG | BODY MASS INDEX: 37.73 KG/M2 | WEIGHT: 212.94 LBS | TEMPERATURE: 98 F | OXYGEN SATURATION: 99 %

## 2020-07-14 DIAGNOSIS — R11.0 NAUSEA: Primary | ICD-10-CM

## 2020-07-14 DIAGNOSIS — R59.9 GLANDS SWOLLEN: ICD-10-CM

## 2020-07-14 PROCEDURE — 99283 EMERGENCY DEPT VISIT LOW MDM: CPT

## 2020-07-14 PROCEDURE — 25000003 PHARM REV CODE 250: Performed by: EMERGENCY MEDICINE

## 2020-07-14 RX ORDER — ONDANSETRON 4 MG/1
4 TABLET, ORALLY DISINTEGRATING ORAL
Status: COMPLETED | OUTPATIENT
Start: 2020-07-14 | End: 2020-07-14

## 2020-07-14 RX ORDER — ONDANSETRON 4 MG/1
4 TABLET, ORALLY DISINTEGRATING ORAL EVERY 8 HOURS PRN
Qty: 11 TABLET | Refills: 0 | Status: SHIPPED | OUTPATIENT
Start: 2020-07-14 | End: 2020-07-17

## 2020-07-14 RX ADMIN — ONDANSETRON 4 MG: 4 TABLET, ORALLY DISINTEGRATING ORAL at 04:07

## 2020-07-14 NOTE — TELEPHONE ENCOUNTER
Had sx carpal tunnel release today. S/s started 8pm and are worsening.   Pain under jaw- to soft tissue areas on both sides.   is area where to ur phone wld go.  Rated  3/10. No fever. Not lightheaded or dizzy. Can open mouth completely with no pain. No swallowing problems.no SOB Area Feels and looks swollen. Feels like a fluid pocket. No rash.   Not bruised or red. No neck pain but pain with movement of neck left or right. S/s started around: 8 pm and at first noticed with just movement of neck left or right  and now is just present but worse with left or right neck movement.      maty Aldridge- with answering service-  connected me.   Connected and answering service states does not take call for Dr Wolfe.  [phone]--  Aravind Neal--does     Spoke with Dr Wolfe- get better by tomorrow. prob anesthesia thing. Ice and pain pill. Gave pt MD instructions and ED precautions- pt verbalizes understanding.       Reason for Disposition   [1] Caller has URGENT question AND [2] triager unable to answer question    Additional Information   Negative: Sounds like a life-threatening emergency to the triager   Negative: [1] Widespread rash AND [2] bright red, sunburn-like   Negative: [1] SEVERE headache AND [2] after spinal (epidural) anesthesia   Negative: [1] Vomiting AND [2] persists > 4 hours   Negative: [1] Vomiting AND [2] abdomen looks much more swollen than usual   Negative: [1] Drinking very little AND [2] dehydration suspected (e.g., no urine > 12 hours, very dry mouth, very lightheaded)   Negative: Patient sounds very sick or weak to the triager   Negative: Sounds like a serious complication to the triager   Negative: Fever > 100.4 F (38.0 C)   Negative: [1] SEVERE post-op pain (e.g., excruciating, pain scale 8-10) AND [2] not controlled with pain medications   Negative: [1] Major abdominal surgical incision AND [2] wound gaping open AND [3] visible internal organs   Negative: Sounds  like a life-threatening emergency to the triager   Negative: [1] Bleeding from incision AND [2] won't stop after 10 minutes of direct pressure   Negative: [1] Widespread rash AND [2] bright red, sunburn-like    Protocols used: POST-OP SYMPTOMS AND [id number], POST-OP INCISION [id number]-A-AH

## 2020-07-14 NOTE — ED PROVIDER NOTES
"SCRIBE #1 NOTE: I, Kody Ramírez, am scribing for, and in the presence of, Joshua Gee MD. I have scribed the entire note.      History      Chief Complaint   Patient presents with    Neck Pain     pt reports having carpal tunnel release surgery yesterday on left hand; tonight she is having bilateral neck soreness, dizziness; 2 hours after taking prescribed pain medication she felt like she was hallucinating, nauseated, lightheaded    Dizziness    Hand Pain     also reports that left hand is still bleeding and states "i think they missed a stitch or one came out"        Review of patient's allergies indicates:   Allergen Reactions    Dairy aid [lactase] Swelling     Allergic to all dairy products     Adhesive Blisters, Hives and Itching    Latex, natural rubber Rash        HPI   HPI    7/14/2020, 4:21 AM   History obtained from the patient      History of Present Illness: Rosa Maria Jones is a 35 y.o. female patient with a h/o obesity who presents to the Emergency Department for neck swelling which onset gradually yesterday. Pt states she had carpel tunnel release surgery yesterday on left hand; pt states the wound is still bleeding and states " I think they missed a stitch or one came out." Pt has been icing the area. Pt reports neck swelling bilaterally tonight. Pt states that she took Lortab tonight from 11:30-12 and started to feel nausea, dizzy, and pallor. Symptoms are constant and moderate in severity. No mitigating or exacerbating factors reported. Patient denies any fever, chills, fatigue, CP, SOB, cough, body aches, vomiting, and all other sxs at this time. No further complaints or concerns at this time.         Arrival mode: Personal vehicle     PCP: Primary Doctor No       Past Medical History:  Past Medical History:   Diagnosis Date    [facility]    [facility] pt had [facility] all paps since normal    ADHD (attention deficit hyperactivity " disorder)     Anxiety     Asthma     Bipolar disorder     with psychotic features    Diabetes type 2, controlled     Essential hypertension 9/8/2017    Obesity     OCD (obsessive compulsive disorder)     PCOS (polycystic ovarian syndrome)     PTSD (post-traumatic stress disorder)     Tachycardia     chronic        Past Surgical History:  Past Surgical History:   Procedure Laterality Date    CARPAL TUNNEL RELEASE Right 6/1/2020    Procedure: RELEASE, CARPAL TUNNEL;  Surgeon: Sebastian Wolfe MD;  Location: [facility] OR;  Service: Orthopedics;  Laterality: Right;    CONIZATION OF CERVIX USING LOOP ELECTROSURGICAL EXCISION PROCEDURE (LEEP)  2015    [facility] - pap f/u was normal     INJECTION OF JOINT Right 6/1/2020    Procedure: INJECTION, JOINT;  Surgeon: Sebastian Wolfe MD;  Location: [facility] OR;  Service: Orthopedics;  Laterality: Right;  lateral epicondyle injection of the right elbow         Family History:  Family History   Problem Relation Age of Onset    Diabetes Father     Hypertension Father     Cancer Father     Kidney disease Father     Breast cancer Maternal Grandmother     Diabetes Mother     Rheum arthritis Mother     No Known Problems Sister     No Known Problems Brother        Social History:  Social History     Tobacco Use    Smoking status: Current Every Day Smoker     Packs/day: 1.00     Years: 15.00     Pack years: 15.00     Types: Cigarettes    Smokeless tobacco: Never Used   Substance and Sexual Activity    Alcohol use: Never     Frequency: Never    Drug use: No    Sexual activity: Yes     Partners: Male     Birth control/protection: OCP       ROS   Review of Systems   Constitutional: Negative for chills, fatigue and fever.   HENT: Negative for sore throat.         (+) neck swelling bilaterally   Respiratory: Negative for cough and shortness of breath.    Cardiovascular: Negative for chest pain.    Gastrointestinal: Positive for nausea. Negative for vomiting.   Genitourinary: Negative for dysuria.   Musculoskeletal: Negative for back pain and neck pain.   Skin: Positive for pallor. Negative for rash.        (+) bleeding from L hand surgery site   Neurological: Positive for dizziness. Negative for weakness.   Hematological: Does not bruise/bleed easily.   All other systems reviewed and are negative.    Physical Exam      Initial Vitals [07/14/20 0333]   BP Pulse Resp Temp SpO2   (!) 152/86 85 19 98.1 °F (36.7 °C) 96 %      MAP       --          Physical Exam  Nursing Notes and Vital Signs Reviewed.  Constitutional: Patient is in no acute distress. Well-developed and well-nourished.  Head: Atraumatic. Normocephalic.  Eyes: PERRL. EOM intact. Conjunctivae are not pale. No scleral icterus.  ENT: Mucous membranes are moist. Oropharynx is clear and symmetric.   Bilateral submandibular glands are tender and enlarged. No intraoral purulence or sialolith's seen.   Neck: Supple. Full ROM.   Cardiovascular: Regular rate. Regular rhythm. No murmurs, rubs, or gallops. Distal pulses are 2+ and symmetric.  Pulmonary/Chest: No respiratory distress. Clear to auscultation bilaterally. No wheezing or rales.  Abdominal: Soft and non-distended.  There is no tenderness.  No rebound, guarding, or rigidity. Good bowel sounds.  Genitourinary: No CVA tenderness  Musculoskeletal: Moves all extremities. No obvious deformities. No edema. No calf tenderness.  Skin: Warm and dry.  Left Hand: No obvious deformity. Wrist incision clean, dry, and intact. No active bleeding. Radial, median, and ulnar nerves are intact. Radial and ulnar pulses are 2+. Normal capillary refill.  Distal sensation is intact. NVI distally.   Neurological:  Alert, awake, and appropriate.  Normal speech.  No acute focal neurological deficits are appreciated.  Psychiatric: Normal affect. Good eye contact. Appropriate in content.    ED Course    Procedures  ED Vital  "Signs:  Vitals:    07/14/20 0333 07/14/20 0438   BP: (!) 152/86 (!) 149/79   Pulse: 85 81   Resp: 19 17   Temp: 98.1 °F (36.7 °C) 98 °F (36.7 °C)   TempSrc: Oral Oral   SpO2: 96% 99%   Weight: 96.6 kg (212 lb 15.4 oz)    Height: 5' 3" (1.6 m)        Imaging Results:  Imaging Results    None                 The Emergency Provider reviewed the vital signs and test results, which are outlined above.    ED Discussion     4:49 AM: Reassessed pt at this time.  Discussed with pt all pertinent ED information and results. Discussed pt dx and plan of tx. Gave pt all f/u and return to the ED instructions. All questions and concerns were addressed at this time. Pt expresses understanding of information and instructions, and is comfortable with plan to discharge. Pt is stable for discharge.    I discussed with patient and/or family/caretaker that evaluation in the ED does not suggest any emergent or life threatening medical conditions requiring immediate intervention beyond what was provided in the ED, and I believe patient is safe for discharge.  Regardless, an unremarkable evaluation in the ED does not preclude the development or presence of a serious of life threatening condition. As such, patient was instructed to return immediately for any worsening or change in current symptoms.           ED Medication(s):  Medications   ondansetron disintegrating tablet 4 mg (4 mg Oral Given 7/14/20 0437)     Discharge Medication List as of 7/14/2020  4:33 AM      START taking these medications    Details   ondansetron (ZOFRAN-ODT) 4 MG TbDL Take 1 tablet (4 mg total) by mouth every 8 (eight) hours as needed., Starting Tue 7/14/2020, Until Fri 7/17/2020, Normal              Medication List      START taking these medications    ondansetron 4 MG Tbdl  Commonly known as: ZOFRAN-ODT  Take 1 tablet (4 mg total) by mouth every 8 (eight) hours as needed.        ASK your doctor about these medications    AdderalL 15 mg tablet  Generic drug: " dextroamphetamine-amphetamine     albuterol 90 mcg/actuation inhaler  Commonly known as: PROVENTIL/VENTOLIN HFA  INHALE 1 TO 2 PUFFS BY MOUTH EVERY 6 HOURS AS NEEDED     ALPRAZolam 1 MG tablet  Commonly known as: XANAX     FLUoxetine 40 MG capsule     HYDROcodone-acetaminophen  mg per tablet  Commonly known as: NORCO  Take 1 tablet by mouth every 6 (six) hours as needed for Pain.     LORazepam 1 MG tablet  Commonly known as: ATIVAN     norethindrone 0.35 mg tablet  Commonly known as: ORTHO MICRONOR  Take 1 tablet (0.35 mg total) by mouth once daily.     OZEMPIC SUBQ     risperiDONE 2 MG tablet  Commonly known as: RISPERDAL           Where to Get Your Medications      These medications were sent to [facility] - [address]  [address], [facility] [address]    Phone: [phone]   · ondansetron 4 MG Tbdl         Follow-up Information     Sebastian Wolfe MD. Call today.    Specialty: Orthopedic Surgery  Why: For re-evaluation and further treatment  Contact information:  [address]  [place] [address] [phone]             [facility]. Go today.    Specialty: Emergency Medicine  Why: If symptoms worsen, For re-evaluation and further treatment, As needed  Contact information:  [address]  [facility] [address] [phone]                   Medical Decision Making              Scribe Attestation:   Scribe #1: I performed the above scribed service and the documentation accurately describes the services I performed. I attest to the accuracy of the note.    Attending:   Physician Attestation Statement for Scribe #1: I, Joshua Gee MD, personally performed the services described in this documentation, as scribed by Kody Ramírez, in my presence, and it is both accurate and complete.          Clinical Impression       ICD-10-CM ICD-9-CM   1. Nausea  R11.0 787.02   2. Glands swollen  R59.9 785.6        Disposition:   Disposition: Discharged  Condition: Stable         Joshua Gee MD  07/14/20 0756

## 2020-07-17 ENCOUNTER — OFFICE VISIT (OUTPATIENT)
Dept: ORTHOPEDICS | Facility: CLINIC | Age: 35
End: 2020-07-17
Payer: MEDICAID

## 2020-07-17 VITALS
BODY MASS INDEX: 37.56 KG/M2 | SYSTOLIC BLOOD PRESSURE: 130 MMHG | WEIGHT: 212 LBS | DIASTOLIC BLOOD PRESSURE: 80 MMHG | HEART RATE: 124 BPM | HEIGHT: 63 IN

## 2020-07-17 DIAGNOSIS — G56.02 CARPAL TUNNEL SYNDROME OF LEFT WRIST: Primary | ICD-10-CM

## 2020-07-17 PROCEDURE — 99024 POSTOP FOLLOW-UP VISIT: CPT | Mod: ,,, | Performed by: PHYSICIAN ASSISTANT

## 2020-07-17 PROCEDURE — 99999 PR PBB SHADOW E&M-EST. PATIENT-LVL III: ICD-10-PCS | Mod: PBBFAC,,, | Performed by: PHYSICIAN ASSISTANT

## 2020-07-17 PROCEDURE — 99999 PR PBB SHADOW E&M-EST. PATIENT-LVL III: CPT | Mod: PBBFAC,,, | Performed by: PHYSICIAN ASSISTANT

## 2020-07-17 PROCEDURE — 99213 OFFICE O/P EST LOW 20 MIN: CPT | Mod: PBBFAC | Performed by: PHYSICIAN ASSISTANT

## 2020-07-17 PROCEDURE — 99024 PR POST-OP FOLLOW-UP VISIT: ICD-10-PCS | Mod: ,,, | Performed by: PHYSICIAN ASSISTANT

## 2020-07-17 RX ORDER — SITAGLIPTIN AND METFORMIN HYDROCHLORIDE 1000; 100 MG/1; MG/1
TABLET, FILM COATED, EXTENDED RELEASE ORAL
COMMUNITY
Start: 2020-06-29 | End: 2021-11-08

## 2020-07-17 NOTE — PROGRESS NOTES
Subjective:      Patient ID: Rosa Maria Jones is a 35 y.o. female.    Chief Complaint: Post-op Evaluation of the Left Wrist    Pt is a 34 yo female POD 4 L CRT 7/13/2020 with Dr. Wolfe.   Pt notes she was seen Tuesday night in ED s/t reaction from either pain medication, anesthesia or combination, presently resolved with cease of pain meds  Pain is controlled with OTC medications.   Patient notes this did not occur with previous sx 6 weeks ago ( R CRT) and she was able to take the rx pain med without complication.   Associative - incision bleeding, mild and intermittent with mvmt post operatively, but presently resolved since yesterday.           Review of Systems   Constitution: Negative for decreased appetite.   HENT: Negative for congestion.    Eyes: Negative for blurred vision.   Respiratory: Negative for shortness of breath.    Hematologic/Lymphatic: Does not bruise/bleed easily.   Skin: Negative for itching.   Musculoskeletal: Negative for stiffness.   Gastrointestinal: Negative for nausea and vomiting.   Genitourinary: Negative for dysuria.   Neurological: Negative for numbness, paresthesias and sensory change.         Objective:            General    Constitutional: She is oriented to person, place, and time. She appears well-developed and well-nourished.   HENT:   Head: Normocephalic and atraumatic.   Eyes: Pupils are equal, round, and reactive to light.   Neck: Neck supple.   Pulmonary/Chest: Effort normal.   Neurological: She is alert and oriented to person, place, and time.   Psychiatric: She has a normal mood and affect. Her behavior is normal.             Right Hand/Wrist Exam     Comments:            Left Hand/Wrist Exam     Comments:  POD 4  Dressing previously removed from ED  Note new bandaid over incision.     Incision C/D/I, no bleeding  Sutures in place   No warmth or erythema noted   mild tenderness to palpation  str 5/5 finger adb/add & DIP/PIP joints  Sensation intact throughout  digits and jonas surfaces   Cap refill <2  2+ pulses noted                     Assessment:   There are no diagnoses linked to this encounter.       Plan:     Pt is POD 4 L CRT with Dr. Wolfe   Bandage removed    Incision cleaned with peroxide and new bandage applied   Soft splint offered, patient accepted  Patient to continue post op instructions   D/C pain medications, continue with OTC ibuprofen per directions as needed   Will FU in 1 week with Dr. Wolfe

## 2020-07-28 ENCOUNTER — OFFICE VISIT (OUTPATIENT)
Dept: ORTHOPEDICS | Facility: CLINIC | Age: 35
End: 2020-07-28
Payer: MEDICAID

## 2020-07-28 DIAGNOSIS — G56.02 CARPAL TUNNEL SYNDROME OF LEFT WRIST: Primary | ICD-10-CM

## 2020-07-28 PROCEDURE — 99999 PR PBB SHADOW E&M-EST. PATIENT-LVL III: ICD-10-PCS | Mod: PBBFAC,,, | Performed by: ORTHOPAEDIC SURGERY

## 2020-07-28 PROCEDURE — 99024 PR POST-OP FOLLOW-UP VISIT: ICD-10-PCS | Mod: ,,, | Performed by: ORTHOPAEDIC SURGERY

## 2020-07-28 PROCEDURE — 99213 OFFICE O/P EST LOW 20 MIN: CPT | Mod: PBBFAC | Performed by: ORTHOPAEDIC SURGERY

## 2020-07-28 PROCEDURE — 99024 POSTOP FOLLOW-UP VISIT: CPT | Mod: ,,, | Performed by: ORTHOPAEDIC SURGERY

## 2020-07-28 PROCEDURE — 99999 PR PBB SHADOW E&M-EST. PATIENT-LVL III: CPT | Mod: PBBFAC,,, | Performed by: ORTHOPAEDIC SURGERY

## 2020-07-28 RX ORDER — DEXTROAMPHETAMINE SACCHARATE, AMPHETAMINE ASPARTATE, DEXTROAMPHETAMINE SULFATE AND AMPHETAMINE SULFATE 7.5; 7.5; 7.5; 7.5 MG/1; MG/1; MG/1; MG/1
TABLET ORAL
COMMUNITY
Start: 2020-06-02

## 2020-07-28 RX ORDER — AZELASTINE 1 MG/ML
SPRAY, METERED NASAL
COMMUNITY
Start: 2020-07-18 | End: 2022-06-07

## 2020-07-28 RX ORDER — FLUTICASONE PROPIONATE 50 MCG
SPRAY, SUSPENSION (ML) NASAL
COMMUNITY
Start: 2020-07-18 | End: 2022-06-07

## 2020-07-28 NOTE — PROGRESS NOTES
Subjective:     Patient ID: Rosa Maria Jones is a 35 y.o. female.    Chief Complaint: Post-op Evaluation of the Left Hand    The patient is a 35-year-old female status post left carpal tunnel release date of surgery is 07/13/2020.  She seems pleased with the results.      Past Medical History:   Diagnosis Date    Abnormal Pap smear of cervix 2015    at [facility] with what sounds pt had CKC all paps since normal    ADHD (attention deficit hyperactivity disorder)     Anxiety     Asthma     Bipolar disorder     with psychotic features    Diabetes type 2, controlled     Essential hypertension 9/8/2017    Obesity     OCD (obsessive compulsive disorder)     PCOS (polycystic ovarian syndrome)     PTSD (post-traumatic stress disorder)     Tachycardia     chronic      Past Surgical History:   Procedure Laterality Date    CARPAL TUNNEL RELEASE Right 6/1/2020    Procedure: RELEASE, CARPAL TUNNEL;  Surgeon: Sebastian Wolfe MD;  Location: [facility] OR;  Service: Orthopedics;  Laterality: Right;    CARPAL TUNNEL RELEASE Left 7/13/2020    Procedure: RELEASE, CARPAL TUNNEL;  Surgeon: Sebastian Wolfe MD;  Location: [facility] OR;  Service: Orthopedics;  Laterality: Left;    CONIZATION OF CERVIX USING LOOP ELECTROSURGICAL EXCISION PROCEDURE (LEEP)  2015    [facility] - pap f/u was normal     INJECTION OF JOINT Right 6/1/2020    Procedure: INJECTION, JOINT;  Surgeon: Sebastian Wolfe MD;  Location: [facility] OR;  Service: Orthopedics;  Laterality: Right;  lateral epicondyle injection of the right elbow     Family History   Problem Relation Age of Onset    Diabetes Father     Hypertension Father     Cancer Father     Kidney disease Father     Breast cancer Maternal Grandmother     Diabetes Mother     Rheum arthritis Mother     No Known Problems Sister     No Known Problems Brother      Social History     Socioeconomic History    Marital status:       Spouse name: Not on file    Number of children: Not on file    Years of education: Not on file    Highest education level: Not on file   Occupational History    Not on file   Social Needs    Financial resource strain: Not on file    Food insecurity     Worry: Not on file     Inability: Not on file    Transportation needs     Medical: Not on file     Non-medical: Not on file   Tobacco Use    Smoking status: Current Every Day Smoker     Packs/day: 1.00     Years: 15.00     Pack years: 15.00     Types: Cigarettes    Smokeless tobacco: Never Used   Substance and Sexual Activity    Alcohol use: Never     Frequency: Never    Drug use: No    Sexual activity: Yes     Partners: Male     Birth control/protection: OCP   Lifestyle    Physical activity     Days per week: Not on file     Minutes per session: Not on file    Stress: Not on file   Relationships    Social connections     Talks on phone: Not on file     Gets together: Not on file     Attends Denominational service: Not on file     Active member of club or organization: Not on file     Attends meetings of clubs or organizations: Not on file     Relationship status: Not on file   Other Topics Concern    Not on file   Social History Narrative    Not on file     Medication List with Changes/Refills   Current Medications    ALBUTEROL (PROVENTIL/VENTOLIN HFA) 90 MCG/ACTUATION INHALER    INHALE 1 TO 2 PUFFS BY MOUTH EVERY 6 HOURS AS NEEDED    ALPRAZOLAM (XANAX) 1 MG TABLET    Take 1 tablet by mouth 2 (two) times daily as needed for Anxiety.     AZELASTINE (ASTELIN) 137 MCG (0.1 %) NASAL SPRAY        DEXTROAMPHETAMINE-AMPHETAMINE (ADDERALL) 15 MG TABLET    Take 15 mg by mouth 4 (four) times daily.     DEXTROAMPHETAMINE-AMPHETAMINE 30 MG TAB        FLUOXETINE 40 MG CAPSULE    Take 80 mg by mouth every evening.     FLUTICASONE PROPIONATE (FLONASE) 50 MCG/ACTUATION NASAL SPRAY        HYDROCODONE-ACETAMINOPHEN (NORCO)  MG PER TABLET    Take 1 tablet  by mouth every 6 (six) hours as needed for Pain.    JANUMET -1,000 MG TM24        LORAZEPAM (ATIVAN) 1 MG TABLET    Take 1 mg by mouth daily as needed.     NORETHINDRONE (ORTHO MICRONOR) 0.35 MG TABLET    Take 1 tablet (0.35 mg total) by mouth once daily.    RISPERIDONE (RISPERDAL) 2 MG TABLET    Take 1 mg by mouth every evening.     SEMAGLUTIDE (OZEMPIC SUBQ)    Inject into the skin once a week.     Review of patient's allergies indicates:   Allergen Reactions    Dairy aid [lactase] Swelling     Allergic to all dairy products     Adhesive Blisters, Hives and Itching    Latex, natural rubber Rash     Review of Systems   Constitution: Negative for malaise/fatigue.   HENT: Negative for hearing loss.    Eyes: Negative for double vision and visual disturbance.   Cardiovascular: Negative for chest pain.   Respiratory: Positive for wheezing. Negative for shortness of breath.    Endocrine: Negative for cold intolerance.   Hematologic/Lymphatic: Does not bruise/bleed easily.   Skin: Negative for poor wound healing and suspicious lesions.   Musculoskeletal: Negative for gout, joint pain and joint swelling.   Gastrointestinal: Negative for nausea and vomiting.   Genitourinary: Negative for dysuria.   Neurological: Positive for numbness, paresthesias and sensory change.   Psychiatric/Behavioral: Positive for substance abuse. Negative for depression and memory loss. The patient is not nervous/anxious.    Allergic/Immunologic: Negative for persistent infections.       Objective:   There is no height or weight on file to calculate BMI.  There were no vitals filed for this visit.             General    Constitutional: She is oriented to person, place, and time. She appears well-developed and well-nourished. No distress.   HENT:   Head: Normocephalic.   Eyes: EOM are normal.   Neck: Normal range of motion.   Pulmonary/Chest: Effort normal.   Neurological: She is oriented to person, place, and time.   Psychiatric: She has a  normal mood and affect.         Left Hand/Wrist Exam     Inspection   Scars: Wrist - present Hand -  present  Effusion: Wrist - absent Hand -  absent    Other     Sensory Exam  Median Distribution: normal  Ulnar Distribution: normal  Radial Distribution: normal    Comments:  The suture line is intact left carpal tunnel scar.  There is no sign of infection.  There are no motor or sensory deficits.          Vascular Exam       Capillary Refill  Left Hand: normal capillary refill      Relevant imaging results reviewed and interpreted by me, discussed with the patient and / or family today radiographs were not obtained today  Assessment:     Encounter Diagnosis   Name Primary?    Carpal tunnel syndrome of left wrist Yes    status post bilateral carpal tunnel release    Plan:     The patient had the sutures removed left wrist today.  Steri-Strips were applied.  Wound care was discussed.  She declines referral to physical therapy.  She will return on an as-needed basis.                Disclaimer: This note was prepared using a voice recognition system and is likely to have sound alike errors within the text.

## 2020-08-28 ENCOUNTER — TELEPHONE (OUTPATIENT)
Dept: OBSTETRICS AND GYNECOLOGY | Facility: CLINIC | Age: 35
End: 2020-08-28

## 2020-08-28 NOTE — TELEPHONE ENCOUNTER
Patient stated that [facility] will fax over records from endocrinologist and she needs to follow up as soon as possible.  Patient given correct fax number [phone] and appointment scheduled with Dr Isabel on 09/08/20 at 2pm [facility].

## 2020-09-08 ENCOUNTER — PATIENT MESSAGE (OUTPATIENT)
Dept: OBSTETRICS AND GYNECOLOGY | Facility: CLINIC | Age: 35
End: 2020-09-08

## 2020-09-15 ENCOUNTER — TELEPHONE (OUTPATIENT)
Dept: RADIOLOGY | Facility: HOSPITAL | Age: 35
End: 2020-09-15

## 2020-09-21 ENCOUNTER — TELEPHONE (OUTPATIENT)
Dept: RADIOLOGY | Facility: HOSPITAL | Age: 35
End: 2020-09-21

## 2020-09-21 ENCOUNTER — PATIENT MESSAGE (OUTPATIENT)
Dept: OBSTETRICS AND GYNECOLOGY | Facility: CLINIC | Age: 35
End: 2020-09-21

## 2020-09-22 ENCOUNTER — OFFICE VISIT (OUTPATIENT)
Dept: OBSTETRICS AND GYNECOLOGY | Facility: CLINIC | Age: 35
End: 2020-09-22
Payer: MEDICAID

## 2020-09-22 VITALS
DIASTOLIC BLOOD PRESSURE: 100 MMHG | SYSTOLIC BLOOD PRESSURE: 144 MMHG | BODY MASS INDEX: 37.33 KG/M2 | WEIGHT: 210.75 LBS

## 2020-09-22 DIAGNOSIS — L68.0 HIRSUTISM: ICD-10-CM

## 2020-09-22 DIAGNOSIS — E28.2 POLYCYSTIC OVARIAN DISEASE: Primary | ICD-10-CM

## 2020-09-22 PROCEDURE — 99999 PR PBB SHADOW E&M-EST. PATIENT-LVL III: ICD-10-PCS | Mod: PBBFAC,,, | Performed by: OBSTETRICS & GYNECOLOGY

## 2020-09-22 PROCEDURE — 99212 OFFICE O/P EST SF 10 MIN: CPT | Mod: S$PBB,,, | Performed by: OBSTETRICS & GYNECOLOGY

## 2020-09-22 PROCEDURE — 99212 PR OFFICE/OUTPT VISIT, EST, LEVL II, 10-19 MIN: ICD-10-PCS | Mod: S$PBB,,, | Performed by: OBSTETRICS & GYNECOLOGY

## 2020-09-22 PROCEDURE — 99999 PR PBB SHADOW E&M-EST. PATIENT-LVL III: CPT | Mod: PBBFAC,,, | Performed by: OBSTETRICS & GYNECOLOGY

## 2020-09-22 PROCEDURE — 99213 OFFICE O/P EST LOW 20 MIN: CPT | Mod: PBBFAC | Performed by: OBSTETRICS & GYNECOLOGY

## 2020-09-22 RX ORDER — SPIRONOLACTONE 50 MG/1
50 TABLET, FILM COATED ORAL DAILY
Qty: 30 TABLET | Refills: 4 | Status: SHIPPED | OUTPATIENT
Start: 2020-09-22 | End: 2022-06-07

## 2020-09-22 RX ORDER — DILTIAZEM HYDROCHLORIDE 240 MG/1
240 CAPSULE, COATED, EXTENDED RELEASE ORAL
COMMUNITY
Start: 2020-09-21 | End: 2021-03-20

## 2020-09-22 NOTE — PROGRESS NOTES
Subjective:       Patient ID: Rosa Maria Jones is a 35 y.o. female.    Chief Complaint:  Follow-up      History of Present Illness  HPI  Pt is here for follow up.  Pt's new Endocrinologist advised her to report to Gyn due to her recent elevated Testosterone results.  Reports no new issues.  Has known about elevated Testosterone results for a long time and has previously been treated for this issue in the past (Stopped Aldactone and is now on Micronor).  Reports no new issues.  Still notes hair growth on face.    GYN & OB History  No LMP recorded. (Menstrual status: Other).   Date of Last Pap: 2018    OB History    Para Term  AB Living   1       1     SAB TAB Ectopic Multiple Live Births   1              # Outcome Date GA Lbr Dc/2nd Weight Sex Delivery Anes PTL Lv   1 SAB                Review of Systems  Review of Systems   Constitutional: Negative for activity change, appetite change, chills, fatigue, fever and unexpected weight change.   Respiratory: Negative for shortness of breath.    Cardiovascular: Positive for palpitations. Negative for chest pain and leg swelling.   Gastrointestinal: Negative for abdominal pain, bloating, blood in stool, constipation, diarrhea, nausea and vomiting.   Genitourinary: Negative for dysmenorrhea, dyspareunia, dysuria, flank pain, frequency, genital sores, hematuria, menorrhagia, menstrual problem, pelvic pain, urgency, vaginal bleeding, vaginal discharge, vaginal pain, urinary incontinence, postcoital bleeding, vaginal dryness and vaginal odor.   Musculoskeletal: Negative for back pain.   Neurological: Negative for syncope and headaches.           Objective:    Physical Exam:   Constitutional: She is oriented to person, place, and time. She appears well-developed and well-nourished. No distress.                           Neurological: She is alert and oriented to person, place, and time.     Psychiatric: She has a normal mood and affect. Her behavior  is normal. Thought content normal.          Assessment:        1. Polycystic ovarian disease    2. Hirsutism             Plan:      Polycystic ovarian disease  -     Reviewed records from Endocrinologist.  No remarkable or new findings.  Pt is responding well to Micronor.  Recommend that pt follow with Endocrinology to optimize DM care.    Hirsutism  -     spironolactone (ALDACTONE) 50 MG tablet; Take 1 tablet (50 mg total) by mouth once daily.  Dispense: 30 tablet; Refill: 4  -     Pt again counseled on treatment options.  Desires to restart Spironolactone.  Recent CMP reviewed and is normal.  Pt will follow K levels with Endocrinology.      Follow up in about 4 months (around 1/22/2021) for Annual exam and follow up.

## 2020-09-28 ENCOUNTER — TELEPHONE (OUTPATIENT)
Dept: RADIOLOGY | Facility: HOSPITAL | Age: 35
End: 2020-09-28

## 2020-10-05 ENCOUNTER — TELEPHONE (OUTPATIENT)
Dept: RADIOLOGY | Facility: HOSPITAL | Age: 35
End: 2020-10-05

## 2020-10-08 ENCOUNTER — TELEPHONE (OUTPATIENT)
Dept: RADIOLOGY | Facility: HOSPITAL | Age: 35
End: 2020-10-08

## 2021-01-26 DIAGNOSIS — M25.522 BILATERAL ELBOW JOINT PAIN: Primary | ICD-10-CM

## 2021-01-26 DIAGNOSIS — M25.521 BILATERAL ELBOW JOINT PAIN: Primary | ICD-10-CM

## 2021-04-29 ENCOUNTER — PATIENT MESSAGE (OUTPATIENT)
Dept: RESEARCH | Facility: HOSPITAL | Age: 36
End: 2021-04-29

## 2021-06-07 ENCOUNTER — CLINICAL SUPPORT (OUTPATIENT)
Dept: AUDIOLOGY | Facility: CLINIC | Age: 36
End: 2021-06-07
Payer: MEDICAID

## 2021-06-07 ENCOUNTER — OFFICE VISIT (OUTPATIENT)
Dept: OTOLARYNGOLOGY | Facility: CLINIC | Age: 36
End: 2021-06-07
Payer: MEDICAID

## 2021-06-07 VITALS
SYSTOLIC BLOOD PRESSURE: 123 MMHG | HEART RATE: 91 BPM | BODY MASS INDEX: 34.5 KG/M2 | DIASTOLIC BLOOD PRESSURE: 84 MMHG | TEMPERATURE: 99 F | HEIGHT: 63 IN | WEIGHT: 194.69 LBS

## 2021-06-07 DIAGNOSIS — H91.90 PERCEIVED HEARING CHANGES: ICD-10-CM

## 2021-06-07 DIAGNOSIS — H90.12 CONDCTV HEAR LOSS, UNI, LEFT EAR, W UNRESTR HEAR CNTRA SIDE: Primary | ICD-10-CM

## 2021-06-07 DIAGNOSIS — H92.02 EAR PAIN, LEFT: ICD-10-CM

## 2021-06-07 DIAGNOSIS — H92.02 EAR PAIN, LEFT: Primary | ICD-10-CM

## 2021-06-07 PROCEDURE — 99999 PR PBB SHADOW E&M-EST. PATIENT-LVL IV: CPT | Mod: PBBFAC,,, | Performed by: PHYSICIAN ASSISTANT

## 2021-06-07 PROCEDURE — 92567 TYMPANOMETRY: CPT | Mod: PBBFAC | Performed by: AUDIOLOGIST-HEARING AID FITTER

## 2021-06-07 PROCEDURE — 99214 PR OFFICE/OUTPT VISIT, EST, LEVL IV, 30-39 MIN: ICD-10-PCS | Mod: S$PBB,,, | Performed by: PHYSICIAN ASSISTANT

## 2021-06-07 PROCEDURE — 99999 PR PBB SHADOW E&M-EST. PATIENT-LVL II: ICD-10-PCS | Mod: PBBFAC,,, | Performed by: AUDIOLOGIST-HEARING AID FITTER

## 2021-06-07 PROCEDURE — 99999 PR PBB SHADOW E&M-EST. PATIENT-LVL II: CPT | Mod: PBBFAC,,, | Performed by: AUDIOLOGIST-HEARING AID FITTER

## 2021-06-07 PROCEDURE — 99212 OFFICE O/P EST SF 10 MIN: CPT | Mod: PBBFAC,27,25 | Performed by: AUDIOLOGIST-HEARING AID FITTER

## 2021-06-07 PROCEDURE — 92557 COMPREHENSIVE HEARING TEST: CPT | Mod: PBBFAC | Performed by: AUDIOLOGIST-HEARING AID FITTER

## 2021-06-07 PROCEDURE — 99214 OFFICE O/P EST MOD 30 MIN: CPT | Mod: S$PBB,,, | Performed by: PHYSICIAN ASSISTANT

## 2021-06-07 PROCEDURE — 99999 PR PBB SHADOW E&M-EST. PATIENT-LVL IV: ICD-10-PCS | Mod: PBBFAC,,, | Performed by: PHYSICIAN ASSISTANT

## 2021-06-07 PROCEDURE — 99214 OFFICE O/P EST MOD 30 MIN: CPT | Mod: PBBFAC,25 | Performed by: PHYSICIAN ASSISTANT

## 2021-06-07 RX ORDER — AMOXICILLIN AND CLAVULANATE POTASSIUM 500; 125 MG/1; MG/1
1 TABLET, FILM COATED ORAL EVERY 8 HOURS
COMMUNITY
Start: 2021-06-04 | End: 2022-06-07

## 2021-06-07 RX ORDER — FLUCONAZOLE 150 MG/1
150 TABLET ORAL DAILY
Qty: 2 TABLET | Refills: 0 | Status: SHIPPED | OUTPATIENT
Start: 2021-06-07 | End: 2021-06-09

## 2021-06-14 ENCOUNTER — OFFICE VISIT (OUTPATIENT)
Dept: OTOLARYNGOLOGY | Facility: CLINIC | Age: 36
End: 2021-06-14
Payer: MEDICAID

## 2021-06-14 VITALS
HEART RATE: 86 BPM | DIASTOLIC BLOOD PRESSURE: 102 MMHG | TEMPERATURE: 98 F | BODY MASS INDEX: 34.56 KG/M2 | SYSTOLIC BLOOD PRESSURE: 158 MMHG | WEIGHT: 195.13 LBS

## 2021-06-14 DIAGNOSIS — R09.82 PND (POST-NASAL DRIP): ICD-10-CM

## 2021-06-14 DIAGNOSIS — J02.9 SORE THROAT: Primary | ICD-10-CM

## 2021-06-14 DIAGNOSIS — K21.9 GASTROESOPHAGEAL REFLUX DISEASE WITHOUT ESOPHAGITIS: ICD-10-CM

## 2021-06-14 DIAGNOSIS — J45.20 MILD INTERMITTENT ASTHMA WITHOUT COMPLICATION: ICD-10-CM

## 2021-06-14 LAB — GROUP A STREP, MOLECULAR: NEGATIVE

## 2021-06-14 PROCEDURE — 99214 PR OFFICE/OUTPT VISIT, EST, LEVL IV, 30-39 MIN: ICD-10-PCS | Mod: S$PBB,,, | Performed by: PHYSICIAN ASSISTANT

## 2021-06-14 PROCEDURE — 99999 PR PBB SHADOW E&M-EST. PATIENT-LVL III: CPT | Mod: PBBFAC,,, | Performed by: PHYSICIAN ASSISTANT

## 2021-06-14 PROCEDURE — 87651 STREP A DNA AMP PROBE: CPT | Performed by: PHYSICIAN ASSISTANT

## 2021-06-14 PROCEDURE — 99999 PR PBB SHADOW E&M-EST. PATIENT-LVL III: ICD-10-PCS | Mod: PBBFAC,,, | Performed by: PHYSICIAN ASSISTANT

## 2021-06-14 PROCEDURE — 99213 OFFICE O/P EST LOW 20 MIN: CPT | Mod: PBBFAC | Performed by: PHYSICIAN ASSISTANT

## 2021-06-14 PROCEDURE — 99214 OFFICE O/P EST MOD 30 MIN: CPT | Mod: S$PBB,,, | Performed by: PHYSICIAN ASSISTANT

## 2021-06-14 RX ORDER — FLUTICASONE PROPIONATE 50 MCG
2 SPRAY, SUSPENSION (ML) NASAL 2 TIMES DAILY
Qty: 18.2 ML | Refills: 6 | Status: SHIPPED | OUTPATIENT
Start: 2021-06-14 | End: 2021-07-14

## 2021-06-14 RX ORDER — ALBUTEROL SULFATE 90 UG/1
AEROSOL, METERED RESPIRATORY (INHALATION)
Qty: 8.5 G | Refills: 0 | Status: SHIPPED | OUTPATIENT
Start: 2021-06-14 | End: 2021-11-08 | Stop reason: SDUPTHER

## 2021-11-01 ENCOUNTER — HOSPITAL ENCOUNTER (EMERGENCY)
Facility: HOSPITAL | Age: 36
Discharge: HOME | End: 2021-11-02
Attending: EMERGENCY MEDICINE
Payer: MEDICAID

## 2021-11-01 DIAGNOSIS — R73.9 HYPERGLYCEMIA: ICD-10-CM

## 2021-11-01 DIAGNOSIS — N39.0 URINARY TRACT INFECTION WITHOUT HEMATURIA, SITE UNSPECIFIED: Primary | ICD-10-CM

## 2021-11-01 LAB
B-HCG UR QL: NEGATIVE
B-OH-BUTYR BLD STRIP-SCNC: 0.1 MMOL/L (ref 0–0.5)
BASOPHILS # BLD AUTO: 0.04 K/UL (ref 0–0.2)
BASOPHILS NFR BLD: 0.2 % (ref 0–1.9)
DIFFERENTIAL METHOD: ABNORMAL
EOSINOPHIL # BLD AUTO: 0.1 K/UL (ref 0–0.5)
EOSINOPHIL NFR BLD: 0.4 % (ref 0–8)
ERYTHROCYTE [DISTWIDTH] IN BLOOD BY AUTOMATED COUNT: 12.3 % (ref 11.5–14.5)
HCT VFR BLD AUTO: 40.9 % (ref 37–48.5)
HGB BLD-MCNC: 14.6 G/DL (ref 12–16)
IMM GRANULOCYTES # BLD AUTO: 0.43 K/UL (ref 0–0.04)
IMM GRANULOCYTES NFR BLD AUTO: 2.6 % (ref 0–0.5)
LYMPHOCYTES # BLD AUTO: 1.3 K/UL (ref 1–4.8)
LYMPHOCYTES NFR BLD: 7.9 % (ref 18–48)
MCH RBC QN AUTO: 30.4 PG (ref 27–31)
MCHC RBC AUTO-ENTMCNC: 35.7 G/DL (ref 32–36)
MCV RBC AUTO: 85 FL (ref 82–98)
MONOCYTES # BLD AUTO: 0.5 K/UL (ref 0.3–1)
MONOCYTES NFR BLD: 3.3 % (ref 4–15)
NEUTROPHILS # BLD AUTO: 14.2 K/UL (ref 1.8–7.7)
NEUTROPHILS NFR BLD: 85.6 % (ref 38–73)
NRBC BLD-RTO: 0 /100 WBC
PLATELET # BLD AUTO: 304 K/UL (ref 150–450)
PMV BLD AUTO: 10.8 FL (ref 9.2–12.9)
POCT GLUCOSE: >500 MG/DL (ref 70–110)
RBC # BLD AUTO: 4.81 M/UL (ref 4–5.4)
WBC # BLD AUTO: 16.58 K/UL (ref 3.9–12.7)

## 2021-11-01 PROCEDURE — 82010 KETONE BODYS QUAN: CPT | Performed by: EMERGENCY MEDICINE

## 2021-11-01 PROCEDURE — 82962 GLUCOSE BLOOD TEST: CPT

## 2021-11-01 PROCEDURE — 81000 URINALYSIS NONAUTO W/SCOPE: CPT | Performed by: EMERGENCY MEDICINE

## 2021-11-01 PROCEDURE — 93005 ELECTROCARDIOGRAM TRACING: CPT

## 2021-11-01 PROCEDURE — 96361 HYDRATE IV INFUSION ADD-ON: CPT

## 2021-11-01 PROCEDURE — 80053 COMPREHEN METABOLIC PANEL: CPT | Performed by: EMERGENCY MEDICINE

## 2021-11-01 PROCEDURE — 93010 ELECTROCARDIOGRAM REPORT: CPT | Mod: ,,, | Performed by: INTERNAL MEDICINE

## 2021-11-01 PROCEDURE — 81025 URINE PREGNANCY TEST: CPT | Performed by: EMERGENCY MEDICINE

## 2021-11-01 PROCEDURE — 96375 TX/PRO/DX INJ NEW DRUG ADDON: CPT

## 2021-11-01 PROCEDURE — 25000003 PHARM REV CODE 250: Performed by: EMERGENCY MEDICINE

## 2021-11-01 PROCEDURE — 99284 EMERGENCY DEPT VISIT MOD MDM: CPT | Mod: 25

## 2021-11-01 PROCEDURE — 93010 EKG 12-LEAD: ICD-10-PCS | Mod: ,,, | Performed by: INTERNAL MEDICINE

## 2021-11-01 PROCEDURE — 85025 COMPLETE CBC W/AUTO DIFF WBC: CPT | Performed by: EMERGENCY MEDICINE

## 2021-11-01 PROCEDURE — 83036 HEMOGLOBIN GLYCOSYLATED A1C: CPT | Performed by: EMERGENCY MEDICINE

## 2021-11-01 RX ORDER — SODIUM CHLORIDE 9 MG/ML
100 INJECTION, SOLUTION INTRAVENOUS CONTINUOUS
Status: DISCONTINUED | OUTPATIENT
Start: 2021-11-02 | End: 2021-11-02 | Stop reason: HOSPADM

## 2021-11-01 RX ADMIN — SODIUM CHLORIDE 1000 ML: 0.9 INJECTION, SOLUTION INTRAVENOUS at 11:11

## 2021-11-02 VITALS
RESPIRATION RATE: 17 BRPM | BODY MASS INDEX: 35.75 KG/M2 | HEART RATE: 96 BPM | DIASTOLIC BLOOD PRESSURE: 66 MMHG | TEMPERATURE: 99 F | SYSTOLIC BLOOD PRESSURE: 130 MMHG | WEIGHT: 201.75 LBS | OXYGEN SATURATION: 98 % | HEIGHT: 63 IN

## 2021-11-02 LAB
ALBUMIN SERPL BCP-MCNC: 4.3 G/DL (ref 3.5–5.2)
ALP SERPL-CCNC: 100 U/L (ref 55–135)
ALT SERPL W/O P-5'-P-CCNC: 27 U/L (ref 10–44)
ANION GAP SERPL CALC-SCNC: 12 MMOL/L (ref 8–16)
AST SERPL-CCNC: 15 U/L (ref 10–40)
BACTERIA #/AREA URNS HPF: ABNORMAL /HPF
BILIRUB SERPL-MCNC: 0.3 MG/DL (ref 0.1–1)
BILIRUB UR QL STRIP: NEGATIVE
BUN SERPL-MCNC: 12 MG/DL (ref 6–20)
CALCIUM SERPL-MCNC: 10.3 MG/DL (ref 8.7–10.5)
CHLORIDE SERPL-SCNC: 101 MMOL/L (ref 95–110)
CLARITY UR: CLEAR
CO2 SERPL-SCNC: 19 MMOL/L (ref 23–29)
COLOR UR: YELLOW
CREAT SERPL-MCNC: 1 MG/DL (ref 0.5–1.4)
EST. GFR  (AFRICAN AMERICAN): >60 ML/MIN/1.73 M^2
EST. GFR  (NON AFRICAN AMERICAN): >60 ML/MIN/1.73 M^2
ESTIMATED AVG GLUCOSE: 183 MG/DL (ref 68–131)
GLUCOSE SERPL-MCNC: 540 MG/DL (ref 70–110)
GLUCOSE UR QL STRIP: ABNORMAL
HBA1C MFR BLD: 8 % (ref 4–5.6)
HGB UR QL STRIP: NEGATIVE
KETONES UR QL STRIP: NEGATIVE
LEUKOCYTE ESTERASE UR QL STRIP: ABNORMAL
MICROSCOPIC COMMENT: ABNORMAL
NITRITE UR QL STRIP: NEGATIVE
PH UR STRIP: 7 [PH] (ref 5–8)
POCT GLUCOSE: 375 MG/DL (ref 70–110)
POTASSIUM SERPL-SCNC: 4.3 MMOL/L (ref 3.5–5.1)
PROT SERPL-MCNC: 7.6 G/DL (ref 6–8.4)
PROT UR QL STRIP: NEGATIVE
SODIUM SERPL-SCNC: 132 MMOL/L (ref 136–145)
SP GR UR STRIP: 1.01 (ref 1–1.03)
SQUAMOUS #/AREA URNS HPF: 10 /HPF
URN SPEC COLLECT METH UR: ABNORMAL
UROBILINOGEN UR STRIP-ACNC: NEGATIVE EU/DL
WBC #/AREA URNS HPF: 9 /HPF (ref 0–5)
YEAST URNS QL MICRO: ABNORMAL

## 2021-11-02 PROCEDURE — 82962 GLUCOSE BLOOD TEST: CPT

## 2021-11-02 PROCEDURE — 36415 COLL VENOUS BLD VENIPUNCTURE: CPT | Performed by: EMERGENCY MEDICINE

## 2021-11-02 PROCEDURE — 96361 HYDRATE IV INFUSION ADD-ON: CPT

## 2021-11-02 PROCEDURE — 63600175 PHARM REV CODE 636 W HCPCS: Performed by: EMERGENCY MEDICINE

## 2021-11-02 PROCEDURE — 25000003 PHARM REV CODE 250: Performed by: EMERGENCY MEDICINE

## 2021-11-02 PROCEDURE — 96365 THER/PROPH/DIAG IV INF INIT: CPT

## 2021-11-02 RX ORDER — CIPROFLOXACIN 500 MG/1
500 TABLET ORAL 2 TIMES DAILY
Qty: 14 TABLET | Refills: 0 | Status: SHIPPED | OUTPATIENT
Start: 2021-11-02 | End: 2021-11-09

## 2021-11-02 RX ORDER — GLIMEPIRIDE 1 MG/1
1 TABLET ORAL
Qty: 30 TABLET | Refills: 0 | Status: SHIPPED | OUTPATIENT
Start: 2021-11-02 | End: 2022-06-07

## 2021-11-02 RX ADMIN — SODIUM CHLORIDE 100 ML/HR: 0.9 INJECTION, SOLUTION INTRAVENOUS at 12:11

## 2021-11-02 RX ADMIN — INSULIN HUMAN 5 UNITS: 100 INJECTION, SOLUTION PARENTERAL at 01:11

## 2021-11-02 RX ADMIN — CEFTRIAXONE 1 G: 1 INJECTION, SOLUTION INTRAVENOUS at 01:11

## 2021-11-08 ENCOUNTER — OFFICE VISIT (OUTPATIENT)
Dept: FAMILY MEDICINE | Facility: CLINIC | Age: 36
End: 2021-11-08
Payer: MEDICAID

## 2021-11-08 ENCOUNTER — PATIENT MESSAGE (OUTPATIENT)
Dept: FAMILY MEDICINE | Facility: CLINIC | Age: 36
End: 2021-11-08

## 2021-11-08 DIAGNOSIS — J45.20 MILD INTERMITTENT ASTHMA WITHOUT COMPLICATION: ICD-10-CM

## 2021-11-08 DIAGNOSIS — E11.65 UNCONTROLLED TYPE 2 DIABETES MELLITUS WITH HYPERGLYCEMIA: Primary | ICD-10-CM

## 2021-11-08 DIAGNOSIS — R10.9 ABDOMINAL PAIN, UNSPECIFIED ABDOMINAL LOCATION: ICD-10-CM

## 2021-11-08 PROCEDURE — 99999 PR PBB SHADOW E&M-EST. PATIENT-LVL II: ICD-10-PCS | Mod: PBBFAC,,, | Performed by: FAMILY MEDICINE

## 2021-11-08 PROCEDURE — 99214 OFFICE O/P EST MOD 30 MIN: CPT | Mod: 95,,, | Performed by: FAMILY MEDICINE

## 2021-11-08 PROCEDURE — 99212 OFFICE O/P EST SF 10 MIN: CPT | Mod: PBBFAC,PO | Performed by: FAMILY MEDICINE

## 2021-11-08 PROCEDURE — 99214 PR OFFICE/OUTPT VISIT, EST, LEVL IV, 30-39 MIN: ICD-10-PCS | Mod: 95,,, | Performed by: FAMILY MEDICINE

## 2021-11-08 PROCEDURE — 99999 PR PBB SHADOW E&M-EST. PATIENT-LVL II: CPT | Mod: PBBFAC,,, | Performed by: FAMILY MEDICINE

## 2021-11-08 RX ORDER — ALBUTEROL SULFATE 90 UG/1
AEROSOL, METERED RESPIRATORY (INHALATION)
Qty: 8.5 G | Refills: 0 | Status: SHIPPED | OUTPATIENT
Start: 2021-11-08 | End: 2022-10-18

## 2021-11-11 DIAGNOSIS — E11.9 TYPE 2 DIABETES MELLITUS WITHOUT COMPLICATION: ICD-10-CM

## 2021-12-31 ENCOUNTER — PATIENT MESSAGE (OUTPATIENT)
Dept: OBSTETRICS AND GYNECOLOGY | Facility: CLINIC | Age: 36
End: 2021-12-31
Payer: MEDICAID

## 2022-01-19 DIAGNOSIS — E11.9 TYPE 2 DIABETES MELLITUS WITHOUT COMPLICATION: ICD-10-CM

## 2022-01-27 ENCOUNTER — PATIENT MESSAGE (OUTPATIENT)
Dept: OBSTETRICS AND GYNECOLOGY | Facility: CLINIC | Age: 37
End: 2022-01-27
Payer: MEDICAID

## 2022-01-27 ENCOUNTER — PATIENT MESSAGE (OUTPATIENT)
Dept: FAMILY MEDICINE | Facility: CLINIC | Age: 37
End: 2022-01-27
Payer: MEDICAID

## 2022-01-27 DIAGNOSIS — F31.9 BIPOLAR DEPRESSION: Primary | ICD-10-CM

## 2022-02-04 ENCOUNTER — PATIENT MESSAGE (OUTPATIENT)
Dept: OBSTETRICS AND GYNECOLOGY | Facility: CLINIC | Age: 37
End: 2022-02-04
Payer: MEDICAID

## 2022-04-26 ENCOUNTER — PATIENT MESSAGE (OUTPATIENT)
Dept: FAMILY MEDICINE | Facility: CLINIC | Age: 37
End: 2022-04-26
Payer: MEDICAID

## 2022-04-26 ENCOUNTER — TELEPHONE (OUTPATIENT)
Dept: FAMILY MEDICINE | Facility: CLINIC | Age: 37
End: 2022-04-26
Payer: MEDICAID

## 2022-04-26 ENCOUNTER — PATIENT MESSAGE (OUTPATIENT)
Dept: ADMINISTRATIVE | Facility: HOSPITAL | Age: 37
End: 2022-04-26
Payer: MEDICAID

## 2022-04-26 DIAGNOSIS — E55.9 VITAMIN D INSUFFICIENCY: ICD-10-CM

## 2022-04-26 DIAGNOSIS — F31.9 BIPOLAR DEPRESSION: Primary | ICD-10-CM

## 2022-04-26 DIAGNOSIS — E11.65 UNCONTROLLED TYPE 2 DIABETES MELLITUS WITH HYPERGLYCEMIA: ICD-10-CM

## 2022-05-02 ENCOUNTER — PATIENT MESSAGE (OUTPATIENT)
Dept: ADMINISTRATIVE | Facility: HOSPITAL | Age: 37
End: 2022-05-02
Payer: MEDICAID

## 2022-05-13 ENCOUNTER — PATIENT MESSAGE (OUTPATIENT)
Dept: ADMINISTRATIVE | Facility: HOSPITAL | Age: 37
End: 2022-05-13
Payer: MEDICAID

## 2022-05-14 DIAGNOSIS — E11.9 TYPE 2 DIABETES MELLITUS WITHOUT COMPLICATION, UNSPECIFIED WHETHER LONG TERM INSULIN USE: ICD-10-CM

## 2022-05-30 ENCOUNTER — TELEPHONE (OUTPATIENT)
Dept: OTOLARYNGOLOGY | Facility: CLINIC | Age: 37
End: 2022-05-30
Payer: MEDICAID

## 2022-05-30 DIAGNOSIS — E04.1 THYROID NODULE: Primary | ICD-10-CM

## 2022-05-30 NOTE — TELEPHONE ENCOUNTER
----- Message from Bao Bolden MD sent at 5/30/2022  2:06 PM CDT -----  Regarding: FW: thyroid u/s order  Thyroid US order is in please schedule with pt, thank you!      ----- Message -----  From: Maria Fernanda Mueller LPN  Sent: 5/25/2022   3:30 PM CDT  To: Bao Bolden MD  Subject: thyroid u/s order                                Pt wishes to proceed with thyroid u/s.  Please place new order.

## 2022-06-07 ENCOUNTER — OFFICE VISIT (OUTPATIENT)
Dept: OBSTETRICS AND GYNECOLOGY | Facility: CLINIC | Age: 37
End: 2022-06-07
Payer: MEDICAID

## 2022-06-07 VITALS
DIASTOLIC BLOOD PRESSURE: 70 MMHG | HEIGHT: 63 IN | BODY MASS INDEX: 34.68 KG/M2 | WEIGHT: 195.75 LBS | SYSTOLIC BLOOD PRESSURE: 116 MMHG

## 2022-06-07 DIAGNOSIS — Z01.419 WELL WOMAN EXAM WITH ROUTINE GYNECOLOGICAL EXAM: Primary | ICD-10-CM

## 2022-06-07 PROCEDURE — 3008F BODY MASS INDEX DOCD: CPT | Mod: CPTII,,, | Performed by: OBSTETRICS & GYNECOLOGY

## 2022-06-07 PROCEDURE — 3008F PR BODY MASS INDEX (BMI) DOCUMENTED: ICD-10-PCS | Mod: CPTII,,, | Performed by: OBSTETRICS & GYNECOLOGY

## 2022-06-07 PROCEDURE — 99999 PR PBB SHADOW E&M-EST. PATIENT-LVL III: ICD-10-PCS | Mod: PBBFAC,,, | Performed by: OBSTETRICS & GYNECOLOGY

## 2022-06-07 PROCEDURE — 88175 CYTOPATH C/V AUTO FLUID REDO: CPT | Performed by: OBSTETRICS & GYNECOLOGY

## 2022-06-07 PROCEDURE — 87624 HPV HI-RISK TYP POOLED RSLT: CPT | Performed by: OBSTETRICS & GYNECOLOGY

## 2022-06-07 PROCEDURE — 1160F RVW MEDS BY RX/DR IN RCRD: CPT | Mod: CPTII,,, | Performed by: OBSTETRICS & GYNECOLOGY

## 2022-06-07 PROCEDURE — 99395 PR PREVENTIVE VISIT,EST,18-39: ICD-10-PCS | Mod: S$PBB,,, | Performed by: OBSTETRICS & GYNECOLOGY

## 2022-06-07 PROCEDURE — 1159F MED LIST DOCD IN RCRD: CPT | Mod: CPTII,,, | Performed by: OBSTETRICS & GYNECOLOGY

## 2022-06-07 PROCEDURE — 99213 OFFICE O/P EST LOW 20 MIN: CPT | Mod: PBBFAC | Performed by: OBSTETRICS & GYNECOLOGY

## 2022-06-07 PROCEDURE — 3074F PR MOST RECENT SYSTOLIC BLOOD PRESSURE < 130 MM HG: ICD-10-PCS | Mod: CPTII,,, | Performed by: OBSTETRICS & GYNECOLOGY

## 2022-06-07 PROCEDURE — 1160F PR REVIEW ALL MEDS BY PRESCRIBER/CLIN PHARMACIST DOCUMENTED: ICD-10-PCS | Mod: CPTII,,, | Performed by: OBSTETRICS & GYNECOLOGY

## 2022-06-07 PROCEDURE — 3078F DIAST BP <80 MM HG: CPT | Mod: CPTII,,, | Performed by: OBSTETRICS & GYNECOLOGY

## 2022-06-07 PROCEDURE — 99999 PR PBB SHADOW E&M-EST. PATIENT-LVL III: CPT | Mod: PBBFAC,,, | Performed by: OBSTETRICS & GYNECOLOGY

## 2022-06-07 PROCEDURE — 99395 PREV VISIT EST AGE 18-39: CPT | Mod: S$PBB,,, | Performed by: OBSTETRICS & GYNECOLOGY

## 2022-06-07 PROCEDURE — 1159F PR MEDICATION LIST DOCUMENTED IN MEDICAL RECORD: ICD-10-PCS | Mod: CPTII,,, | Performed by: OBSTETRICS & GYNECOLOGY

## 2022-06-07 PROCEDURE — 3074F SYST BP LT 130 MM HG: CPT | Mod: CPTII,,, | Performed by: OBSTETRICS & GYNECOLOGY

## 2022-06-07 PROCEDURE — 3078F PR MOST RECENT DIASTOLIC BLOOD PRESSURE < 80 MM HG: ICD-10-PCS | Mod: CPTII,,, | Performed by: OBSTETRICS & GYNECOLOGY

## 2022-06-07 NOTE — PROGRESS NOTES
Subjective:       Patient ID: Rosa Maria Jones is a 37 y.o. female.    Chief Complaint:  Annual Exam and Vulvar Itch      History of Present Illness  HPI  Annual Exam-Premenopausal  Patient presents for annual exam. The patient has no complaints today. The patient is sexually active. GYN screening history: last pap: approximate date  and was normal. The patient wears seatbelts: yes. The patient participates in regular exercise: yes. Has the patient ever been transfused or tattooed?: yes. The patient reports that there is not domestic violence in her life.  Stopped using [employer] and now menses are regular with periods lasting 5 days.  Denies excessive bleeding or cramping.    GYN & OB History  Patient's last menstrual period was 05/15/2022 (exact date).   Date of Last Pap: 2020    OB History    Para Term  AB Living   1       1     SAB IAB Ectopic Multiple Live Births   1              # Outcome Date GA Lbr Dc/2nd Weight Sex Delivery Anes PTL Lv   1 SAB                Review of Systems  Review of Systems   Constitutional: Negative for activity change, appetite change, chills, fatigue, fever and unexpected weight change.   Respiratory: Negative for shortness of breath.    Cardiovascular: Negative for chest pain, palpitations and leg swelling.   Gastrointestinal: Negative for abdominal pain, bloating, blood in stool, constipation, diarrhea, nausea and vomiting.   Genitourinary: Negative for dysmenorrhea, dyspareunia, dysuria, flank pain, frequency, genital sores, hematuria, menorrhagia, menstrual problem, pelvic pain, urgency, vaginal bleeding, vaginal discharge, vaginal pain, urinary incontinence, postcoital bleeding, vaginal dryness and vaginal odor.   Musculoskeletal: Negative for back pain.   Integumentary:  Negative for breast mass, nipple discharge, breast skin changes and breast tenderness.   Neurological: Positive for headaches. Negative for syncope.   Breast: Negative for  asymmetry, lump, mass, mastodynia, nipple discharge, skin changes and tenderness          Objective:    Physical Exam:   Constitutional: She is oriented to person, place, and time. She appears well-developed and well-nourished. No distress.    HENT:   Head: Normocephalic and atraumatic.    Eyes: Pupils are equal, round, and reactive to light. EOM are normal.     Cardiovascular: Normal rate, regular rhythm and normal heart sounds.     Pulmonary/Chest: Effort normal and breath sounds normal.        Abdominal: Soft. Bowel sounds are normal. She exhibits no distension. There is no abdominal tenderness.     Genitourinary:    Vagina, uterus, right adnexa and left adnexa normal.      Pelvic exam was performed with patient supine.   There is no rash, tenderness, lesion or injury on the right labia. There is no rash, tenderness, lesion or injury on the left labia. Cervix is normal. Right adnexum displays no mass, no tenderness and no fullness. Left adnexum displays no mass, no tenderness and no fullness. No erythema,  no vaginal discharge, tenderness or bleeding in the vagina.    No foreign body in the vagina.      No signs of injury in the vagina.   Cervix exhibits no motion tenderness, no discharge and no friability. Uterus is not deviated, not enlarged and not tender.           Musculoskeletal: Normal range of motion and moves all extremeties. No tenderness or edema.       Neurological: She is alert and oriented to person, place, and time.    Skin: Skin is warm and dry.    Psychiatric: She has a normal mood and affect. Her behavior is normal. Thought content normal.          Assessment:        1. Well woman exam with routine gynecological exam             Plan:      Well woman exam with routine gynecological exam  -     Liquid-Based Pap Smear, Screening  -     HPV High Risk Genotypes, PCR  -     Pt was counseled on cervical/vaginal screening guidelines and recommendations.  Last pap NILM on 2020.  Next pap is due in 2023,  however, pt requests to have pap done today.  If today's pap smear result is negative, next pap smear will be due in 3-5 yrs.  -     Pt was advised on current breast cancer screening recommendations.   -     Follow up with PCP for routine health maintenance needs.  -     Pt counseled on contraception.  Declines.      Follow up in about 1 year (around 6/7/2023).

## 2022-06-13 LAB
HPV HR 12 DNA SPEC QL NAA+PROBE: NEGATIVE
HPV16 AG SPEC QL: NEGATIVE
HPV18 DNA SPEC QL NAA+PROBE: NEGATIVE

## 2022-06-14 LAB
FINAL PATHOLOGIC DIAGNOSIS: NORMAL
Lab: NORMAL

## 2022-06-20 LAB
LEFT EYE DM RETINOPATHY: POSITIVE
RIGHT EYE DM RETINOPATHY: POSITIVE

## 2022-06-28 ENCOUNTER — PATIENT OUTREACH (OUTPATIENT)
Dept: ADMINISTRATIVE | Facility: HOSPITAL | Age: 37
End: 2022-06-28
Payer: MEDICAID

## 2022-07-13 ENCOUNTER — PATIENT MESSAGE (OUTPATIENT)
Dept: ADMINISTRATIVE | Facility: OTHER | Age: 37
End: 2022-07-13
Payer: MEDICAID

## 2022-10-18 DIAGNOSIS — J45.20 MILD INTERMITTENT ASTHMA WITHOUT COMPLICATION: ICD-10-CM

## 2022-10-18 RX ORDER — ALBUTEROL SULFATE 90 UG/1
AEROSOL, METERED RESPIRATORY (INHALATION)
Qty: 25.5 G | Refills: 0 | Status: SHIPPED | OUTPATIENT
Start: 2022-10-18

## 2022-10-18 NOTE — TELEPHONE ENCOUNTER
No new care gaps identified.  [facility] Embedded Care Gaps. Reference number: [id number]. 10/18/2022   4:24:19 AM CDT

## 2022-10-19 NOTE — TELEPHONE ENCOUNTER
Refill Decision Note   Rosa Maria Robert  is requesting a refill authorization.  Brief Assessment and Rationale for Refill:  Approve     Medication Therapy Plan:       Medication Reconciliation Completed: No   Comments:     No Care Gaps recommended.     Note composed:9:43 PM 10/18/2022

## 2022-10-20 ENCOUNTER — PATIENT MESSAGE (OUTPATIENT)
Dept: ADMINISTRATIVE | Facility: HOSPITAL | Age: 37
End: 2022-10-20
Payer: MEDICAID

## 2022-12-30 ENCOUNTER — PATIENT OUTREACH (OUTPATIENT)
Dept: ADMINISTRATIVE | Facility: HOSPITAL | Age: 37
End: 2022-12-30
Payer: MEDICAID

## 2023-03-07 ENCOUNTER — TELEPHONE (OUTPATIENT)
Dept: ORTHOPEDICS | Facility: CLINIC | Age: 38
End: 2023-03-07
Payer: MEDICAID

## 2023-03-07 NOTE — TELEPHONE ENCOUNTER
Called patient regarding appointment with JUANITA Andrews. Juliana does not treat foot pain, and the appointment will need to be cancelled. VM box full. Will call again later.

## 2023-07-12 ENCOUNTER — PATIENT MESSAGE (OUTPATIENT)
Dept: ADMINISTRATIVE | Facility: OTHER | Age: 38
End: 2023-07-12
Payer: MEDICAID

## 2023-09-26 LAB
LEFT EYE DM RETINOPATHY: POSITIVE
RIGHT EYE DM RETINOPATHY: POSITIVE

## 2023-09-28 ENCOUNTER — PATIENT OUTREACH (OUTPATIENT)
Dept: ADMINISTRATIVE | Facility: HOSPITAL | Age: 38
End: 2023-09-28
Payer: MEDICAID

## 2024-11-12 LAB
LEFT EYE DM RETINOPATHY: POSITIVE
RIGHT EYE DM RETINOPATHY: POSITIVE

## 2024-11-18 ENCOUNTER — PATIENT OUTREACH (OUTPATIENT)
Dept: ADMINISTRATIVE | Facility: HOSPITAL | Age: 39
End: 2024-11-18
Payer: MEDICAID

## 2025-05-02 ENCOUNTER — OFFICE VISIT (OUTPATIENT)
Dept: OBSTETRICS AND GYNECOLOGY | Facility: CLINIC | Age: 40
End: 2025-05-02
Payer: MEDICAID

## 2025-05-02 VITALS
WEIGHT: 195.56 LBS | DIASTOLIC BLOOD PRESSURE: 88 MMHG | HEIGHT: 63 IN | BODY MASS INDEX: 34.65 KG/M2 | SYSTOLIC BLOOD PRESSURE: 136 MMHG

## 2025-05-02 DIAGNOSIS — R68.82 DECREASED LIBIDO: ICD-10-CM

## 2025-05-02 DIAGNOSIS — Z01.419 WELL WOMAN EXAM WITH ROUTINE GYNECOLOGICAL EXAM: Primary | ICD-10-CM

## 2025-05-02 PROCEDURE — 99999 PR PBB SHADOW E&M-EST. PATIENT-LVL III: CPT | Mod: PBBFAC,,, | Performed by: OBSTETRICS & GYNECOLOGY

## 2025-05-02 PROCEDURE — 99213 OFFICE O/P EST LOW 20 MIN: CPT | Mod: PBBFAC | Performed by: OBSTETRICS & GYNECOLOGY

## 2025-05-02 RX ORDER — FLUOXETINE HYDROCHLORIDE 20 MG/1
20 CAPSULE ORAL
COMMUNITY

## 2025-05-02 RX ORDER — DEXTROAMPHETAMINE SACCHARATE, AMPHETAMINE ASPARTATE, DEXTROAMPHETAMINE SULFATE AND AMPHETAMINE SULFATE 3.75; 3.75; 3.75; 3.75 MG/1; MG/1; MG/1; MG/1
15 TABLET ORAL 3 TIMES DAILY
COMMUNITY
Start: 2025-02-27

## 2025-05-02 RX ORDER — INSULIN GLARGINE 100 [IU]/ML
INJECTION, SOLUTION SUBCUTANEOUS
COMMUNITY
Start: 2025-04-13

## 2025-05-02 RX ORDER — LISINOPRIL AND HYDROCHLOROTHIAZIDE 20; 25 MG/1; MG/1
1 TABLET ORAL
COMMUNITY

## 2025-05-02 RX ORDER — ALBUTEROL SULFATE 0.83 MG/ML
SOLUTION RESPIRATORY (INHALATION)
COMMUNITY
Start: 2025-03-09

## 2025-05-02 NOTE — PROGRESS NOTES
Subjective     Patient ID: Rosa Maria Jones is a 39 y.o. female.    Chief Complaint:  Well Woman      History of Present Illness  HPI  Annual Exam-Premenopausal  Patient presents for annual exam. The patient has no complaints today. The patient is sexually active. GYN screening history: last pap: approximate date  and was normal and last mammogram: patient has never had a mammogram. The patient wears seatbelts: yes. The patient participates in regular exercise: no. Has the patient ever been transfused or tattooed?: yes. The patient reports that there is not domestic violence in her life.  Menses are regular with periods lasting 3-5 days.  Denies excessive bleeding or but has noted increased cramping controlled by OTC meds.  Reports she and  adopted a 4 yo autistic child 2 yrs ago.   Has noted a significant change in her libido and energy levels for the past 6 months.      GYN & OB History  Patient's last menstrual period was 2025 (approximate).   Date of Last Pap: 2022    OB History    Para Term  AB Living   1 0 0 0 1    SAB IAB Ectopic Multiple Live Births   1 0 0        # Outcome Date GA Lbr Dc/2nd Weight Sex Type Anes PTL Lv   1 SAB                Review of Systems  Review of Systems   Constitutional:  Positive for fatigue. Negative for activity change, appetite change, chills, fever and unexpected weight change.   Respiratory:  Negative for shortness of breath.    Cardiovascular:  Negative for chest pain, palpitations and leg swelling.   Gastrointestinal:  Negative for abdominal pain, bloating, blood in stool, constipation, diarrhea, nausea and vomiting.   Genitourinary:  Positive for decreased libido. Negative for dysmenorrhea, dyspareunia, dysuria, flank pain, frequency, genital sores, hematuria, menorrhagia, menstrual problem, pelvic pain, urgency, vaginal bleeding, vaginal discharge, vaginal pain, urinary incontinence, postcoital bleeding, vaginal dryness and  vaginal odor.   Musculoskeletal:  Negative for back pain.   Integumentary:  Negative for breast mass, nipple discharge, breast skin changes and breast tenderness.   Neurological:  Negative for syncope and headaches.   Breast: Negative for asymmetry, lump, mass, mastodynia, nipple discharge, skin changes and tenderness         Objective   Physical Exam:   Constitutional: She is oriented to person, place, and time. She appears well-developed and well-nourished. No distress.    HENT:   Head: Normocephalic and atraumatic.    Eyes: Pupils are equal, round, and reactive to light. EOM are normal.     Cardiovascular:  Normal rate, regular rhythm and normal heart sounds.             Pulmonary/Chest: Effort normal and breath sounds normal.        Abdominal: Soft. Bowel sounds are normal. She exhibits no distension. There is no abdominal tenderness.     Genitourinary:    Vagina, uterus, right adnexa and left adnexa normal.      Pelvic exam was performed with patient supine.   There is no rash, tenderness, lesion or injury on the right labia. There is no rash, tenderness, lesion or injury on the left labia. Cervix is normal. Right adnexum displays no mass, no tenderness and no fullness. Left adnexum displays no mass, no tenderness and no fullness. No erythema, vaginal discharge, tenderness or bleeding in the vagina.    No foreign body in the vagina.      No signs of injury in the vagina.   Cervix exhibits no motion tenderness, no discharge and no friability.    pap smear completedUterus is not deviated, not enlarged and not tender.           Musculoskeletal: Normal range of motion and moves all extremeties. No tenderness or edema.       Neurological: She is alert and oriented to person, place, and time.    Skin: Skin is warm and dry.    Psychiatric: She has a normal mood and affect. Her behavior is normal. Thought content normal.            Assessment and Plan     1. Well woman exam with routine gynecological exam    2.  Decreased libido           Plan:  Well woman exam with routine gynecological exam  -     Liquid-Based Pap Smear, Screening  -     Mammo Digital Screening Bilat; Future; Expected date: 05/02/2025  -     Pt was counseled on cervical/vaginal screening guidelines and recommendations.  Last pap NILM HPV neg on 2022.  If today's pap smear result is negative, next pap smear will be due in 3 yrs.  -     Pt was advised on current breast cancer screening recommendations.  Pt desires to proceed with screening MMG.  -     Follow up with PCP for routine health maintenance needs.  -     Pt counseled on contraception.  Declines.    Decreased libido  -     Pt was counseled on libido, including the many factors that influence it.  Pt has several risk factors for decreased libido: history of depression, chronic illness (DM), stress.  Pt was counseled on stress reduction and lifestyle modifications (healthy diet, exercise, consistent sleep hours, etc).  Hormone testing or treatment not indicated at this time.  Pt voiced understanding.      Follow up in about 1 year (around 5/2/2026).

## 2025-05-12 ENCOUNTER — RESULTS FOLLOW-UP (OUTPATIENT)
Dept: OBSTETRICS AND GYNECOLOGY | Facility: HOSPITAL | Age: 40
End: 2025-05-12

## 2025-05-13 LAB
LEFT EYE DM RETINOPATHY: POSITIVE
RIGHT EYE DM RETINOPATHY: POSITIVE

## 2025-05-21 ENCOUNTER — PATIENT OUTREACH (OUTPATIENT)
Dept: ADMINISTRATIVE | Facility: HOSPITAL | Age: 40
End: 2025-05-21
Payer: MEDICAID

## (undated) DEVICE — ALCOHOL 70% ISOP RUBBING 4OZ

## (undated) DEVICE — COVER LIGHT HANDLE 80/CA

## (undated) DEVICE — BANDAGE ELASTIC 3X5 VELCRO ST

## (undated) DEVICE — SEE MEDLINE ITEM 157027

## (undated) DEVICE — POSITIONER HEAD DONUT 9IN FOAM

## (undated) DEVICE — SOL 9P NACL IRR PIC IL

## (undated) DEVICE — PAD ABD 8X10 STERILE

## (undated) DEVICE — UNDERGLOVES BIOGEL PI SIZE 7.5

## (undated) DEVICE — PAD CAST SPECIALIST STRL 3

## (undated) DEVICE — PAD CAST SPECIALIST STRL 4

## (undated) DEVICE — SUPPORT ULNA NERVE PROTECTOR

## (undated) DEVICE — SEE MEDLINE ITEM 152522

## (undated) DEVICE — APPLICATOR CHLORAPREP ORN 26ML

## (undated) DEVICE — SUT 4-0 ETHILON 18 PS-2

## (undated) DEVICE — TOURNIQUET SB QC DP 18X4IN

## (undated) DEVICE — DRESSING XEROFORM FOIL PK 1X8

## (undated) DEVICE — SYR 10CC LUER LOCK

## (undated) DEVICE — SEE MEDLINE ITEM 152622

## (undated) DEVICE — SEE MEDLINE ITEM 157131

## (undated) DEVICE — DRAPE PLASTIC U 60X72

## (undated) DEVICE — SEE MEDLINE ITEM 157117

## (undated) DEVICE — NDL SAFETY 25G X 1.5 ECLIPSE

## (undated) DEVICE — SCRUB HIBICLENS 4% CHG 4OZ

## (undated) DEVICE — GLOVE BIOGEL SZ 8 1/2

## (undated) DEVICE — GAUZE SPONGE 4X4 12PLY

## (undated) DEVICE — SEE MEDLINE ITEM 157173

## (undated) DEVICE — COVER CAMERA OPERATING ROOM

## (undated) DEVICE — GLOVE SURG ULTRA TOUCH 7.5

## (undated) DEVICE — GOWN SURG 2XL DISP TIE BACK

## (undated) DEVICE — UNDERGLOVES BIOGEL PI SIZE 8.5